# Patient Record
Sex: FEMALE | Race: WHITE | NOT HISPANIC OR LATINO
[De-identification: names, ages, dates, MRNs, and addresses within clinical notes are randomized per-mention and may not be internally consistent; named-entity substitution may affect disease eponyms.]

---

## 2017-12-19 ENCOUNTER — RESULT REVIEW (OUTPATIENT)
Age: 42
End: 2017-12-19

## 2017-12-27 ENCOUNTER — APPOINTMENT (OUTPATIENT)
Dept: FAMILY MEDICINE | Facility: CLINIC | Age: 42
End: 2017-12-27

## 2018-01-03 ENCOUNTER — APPOINTMENT (OUTPATIENT)
Dept: FAMILY MEDICINE | Facility: CLINIC | Age: 43
End: 2018-01-03

## 2018-01-30 ENCOUNTER — NON-APPOINTMENT (OUTPATIENT)
Age: 43
End: 2018-01-30

## 2018-01-30 ENCOUNTER — APPOINTMENT (OUTPATIENT)
Dept: CARDIOLOGY | Facility: CLINIC | Age: 43
End: 2018-01-30
Payer: COMMERCIAL

## 2018-01-30 VITALS
OXYGEN SATURATION: 98 % | HEIGHT: 70 IN | WEIGHT: 147 LBS | SYSTOLIC BLOOD PRESSURE: 100 MMHG | BODY MASS INDEX: 21.05 KG/M2 | DIASTOLIC BLOOD PRESSURE: 68 MMHG | HEART RATE: 73 BPM

## 2018-01-30 DIAGNOSIS — E78.5 HYPERLIPIDEMIA, UNSPECIFIED: ICD-10-CM

## 2018-01-30 DIAGNOSIS — R94.31 ABNORMAL ELECTROCARDIOGRAM [ECG] [EKG]: ICD-10-CM

## 2018-01-30 PROCEDURE — 99243 OFF/OP CNSLTJ NEW/EST LOW 30: CPT | Mod: 25

## 2018-01-30 PROCEDURE — 93000 ELECTROCARDIOGRAM COMPLETE: CPT

## 2018-02-13 ENCOUNTER — APPOINTMENT (OUTPATIENT)
Dept: CARDIOLOGY | Facility: CLINIC | Age: 43
End: 2018-02-13
Payer: COMMERCIAL

## 2018-02-13 PROCEDURE — 93015 CV STRESS TEST SUPVJ I&R: CPT

## 2018-02-14 ENCOUNTER — APPOINTMENT (OUTPATIENT)
Dept: CARDIOLOGY | Facility: CLINIC | Age: 43
End: 2018-02-14
Payer: COMMERCIAL

## 2018-02-14 PROCEDURE — 93306 TTE W/DOPPLER COMPLETE: CPT

## 2018-03-08 ENCOUNTER — NON-APPOINTMENT (OUTPATIENT)
Age: 43
End: 2018-03-08

## 2018-03-08 PROBLEM — E78.5 HYPERLIPIDEMIA: Status: ACTIVE | Noted: 2018-03-08

## 2018-12-11 ENCOUNTER — RECORD ABSTRACTING (OUTPATIENT)
Age: 43
End: 2018-12-11

## 2018-12-11 DIAGNOSIS — Z78.9 OTHER SPECIFIED HEALTH STATUS: ICD-10-CM

## 2018-12-11 DIAGNOSIS — Z80.1 FAMILY HISTORY OF MALIGNANT NEOPLASM OF TRACHEA, BRONCHUS AND LUNG: ICD-10-CM

## 2018-12-11 RX ORDER — VALACYCLOVIR HYDROCHLORIDE 1 G/1
1 TABLET, FILM COATED ORAL
Refills: 0 | Status: ACTIVE | COMMUNITY

## 2018-12-27 ENCOUNTER — APPOINTMENT (OUTPATIENT)
Dept: OBGYN | Facility: CLINIC | Age: 43
End: 2018-12-27
Payer: COMMERCIAL

## 2018-12-27 ENCOUNTER — LABORATORY RESULT (OUTPATIENT)
Age: 43
End: 2018-12-27

## 2018-12-27 VITALS
BODY MASS INDEX: 20.76 KG/M2 | WEIGHT: 145 LBS | SYSTOLIC BLOOD PRESSURE: 110 MMHG | DIASTOLIC BLOOD PRESSURE: 72 MMHG | HEIGHT: 70 IN

## 2018-12-27 DIAGNOSIS — Z78.9 OTHER SPECIFIED HEALTH STATUS: ICD-10-CM

## 2018-12-27 DIAGNOSIS — Z80.1 FAMILY HISTORY OF MALIGNANT NEOPLASM OF TRACHEA, BRONCHUS AND LUNG: ICD-10-CM

## 2018-12-27 LAB
BILIRUB UR QL STRIP: NORMAL
GLUCOSE UR-MCNC: NORMAL
HCG UR QL: 0.2 EU/DL
HCG UR QL: NEGATIVE
HGB UR QL STRIP.AUTO: NORMAL
KETONES UR-MCNC: NORMAL
LEUKOCYTE ESTERASE UR QL STRIP: NORMAL
NITRITE UR QL STRIP: NORMAL
PH UR STRIP: 6
PROT UR STRIP-MCNC: NORMAL
QUALITY CONTROL: YES
SP GR UR STRIP: 1

## 2018-12-27 PROCEDURE — 81025 URINE PREGNANCY TEST: CPT

## 2018-12-27 PROCEDURE — 81003 URINALYSIS AUTO W/O SCOPE: CPT | Mod: QW

## 2018-12-27 PROCEDURE — 99396 PREV VISIT EST AGE 40-64: CPT

## 2019-01-08 ENCOUNTER — TRANSCRIPTION ENCOUNTER (OUTPATIENT)
Age: 44
End: 2019-01-08

## 2019-01-10 LAB
CYTOLOGY CVX/VAG DOC THIN PREP: NORMAL
HPV HIGH+LOW RISK DNA PNL CVX: DETECTED

## 2019-08-20 ENCOUNTER — APPOINTMENT (OUTPATIENT)
Dept: OBGYN | Facility: CLINIC | Age: 44
End: 2019-08-20
Payer: COMMERCIAL

## 2019-08-20 VITALS
DIASTOLIC BLOOD PRESSURE: 82 MMHG | WEIGHT: 139 LBS | HEIGHT: 70 IN | SYSTOLIC BLOOD PRESSURE: 115 MMHG | BODY MASS INDEX: 19.9 KG/M2

## 2019-08-20 DIAGNOSIS — R10.2 PELVIC AND PERINEAL PAIN: ICD-10-CM

## 2019-08-20 DIAGNOSIS — E22.1 HYPERPROLACTINEMIA: ICD-10-CM

## 2019-08-20 DIAGNOSIS — D25.9 LEIOMYOMA OF UTERUS, UNSPECIFIED: ICD-10-CM

## 2019-08-20 LAB
BILIRUB UR QL STRIP: NORMAL
GLUCOSE UR-MCNC: NORMAL
HCG UR QL: 0.2 EU/DL
HCG UR QL: NEGATIVE
HGB UR QL STRIP.AUTO: ABNORMAL
KETONES UR-MCNC: NORMAL
LEUKOCYTE ESTERASE UR QL STRIP: NORMAL
NITRITE UR QL STRIP: NORMAL
PH UR STRIP: 7
PROT UR STRIP-MCNC: NORMAL
QUALITY CONTROL: YES
SP GR UR STRIP: 1.01

## 2019-08-20 PROCEDURE — 76830 TRANSVAGINAL US NON-OB: CPT

## 2019-08-20 PROCEDURE — XXXXX: CPT

## 2019-08-20 NOTE — HISTORY OF PRESENT ILLNESS
[___ Month(s) Ago] : [unfilled] month(s) ago [Good] : being in good health [Healthy Diet] : a healthy diet [Regular Exercise] : regular exercise [Last Mammogram ___] : Last Mammogram was [unfilled] [Last Pap ___] : Last cervical pap smear was [unfilled] [Reproductive Age] : is of reproductive age [Definite:  ___ (Date)] : the last menstrual period was [unfilled]

## 2019-08-21 LAB
BASOPHILS # BLD AUTO: 0.06 K/UL
BASOPHILS NFR BLD AUTO: 0.8 %
EOSINOPHIL # BLD AUTO: 0.07 K/UL
EOSINOPHIL NFR BLD AUTO: 0.9 %
ESTRADIOL SERPL-MCNC: 148 PG/ML
FSH SERPL-MCNC: 4.9 IU/L
HCT VFR BLD CALC: 42 %
HGB BLD-MCNC: 14.1 G/DL
IMM GRANULOCYTES NFR BLD AUTO: 0.3 %
LYMPHOCYTES # BLD AUTO: 2.5 K/UL
LYMPHOCYTES NFR BLD AUTO: 31.4 %
MAN DIFF?: NORMAL
MCHC RBC-ENTMCNC: 32.7 PG
MCHC RBC-ENTMCNC: 33.6 GM/DL
MCV RBC AUTO: 97.4 FL
MONOCYTES # BLD AUTO: 0.5 K/UL
MONOCYTES NFR BLD AUTO: 6.3 %
NEUTROPHILS # BLD AUTO: 4.82 K/UL
NEUTROPHILS NFR BLD AUTO: 60.3 %
PLATELET # BLD AUTO: 238 K/UL
PROLACTIN SERPL-MCNC: 37.1 NG/ML
RBC # BLD: 4.31 M/UL
RBC # FLD: 12 %
TSH SERPL-ACNC: 4.09 UIU/ML
WBC # FLD AUTO: 7.97 K/UL

## 2019-08-28 ENCOUNTER — APPOINTMENT (OUTPATIENT)
Dept: OBGYN | Facility: CLINIC | Age: 44
End: 2019-08-28
Payer: COMMERCIAL

## 2019-08-28 VITALS
BODY MASS INDEX: 19.76 KG/M2 | DIASTOLIC BLOOD PRESSURE: 60 MMHG | HEIGHT: 70 IN | SYSTOLIC BLOOD PRESSURE: 104 MMHG | WEIGHT: 138 LBS

## 2019-08-28 DIAGNOSIS — E03.9 HYPOTHYROIDISM, UNSPECIFIED: ICD-10-CM

## 2019-08-28 DIAGNOSIS — F41.9 ANXIETY DISORDER, UNSPECIFIED: ICD-10-CM

## 2019-08-28 PROCEDURE — 99214 OFFICE O/P EST MOD 30 MIN: CPT

## 2019-08-28 PROCEDURE — 36415 COLL VENOUS BLD VENIPUNCTURE: CPT

## 2019-08-28 NOTE — HISTORY OF PRESENT ILLNESS
[Last Mammogram ___] : Last Mammogram was [unfilled] [Last Pap ___] : Last cervical pap smear was [unfilled] [Reproductive Age] : is of reproductive age [Sexually Active] : is sexually active [Male ___] : [unfilled] male

## 2019-08-29 NOTE — END OF VISIT
[FreeTextEntry3] : I, Marylou Sutton, acted solely as a scribe for Dr. Mcdaniel on this date [8/28/19].\par \par All medical record entries made by the Scribe were at my, Dr. Mcdaniel's direction and personally dictated by me on [8/28/19]. I have reviewed the chart and agree that the record accurately reflects my personal performance of the history, physical exam, assessment and plan. I have also personally directed, reviewed, and agreed with the chart.\par

## 2019-08-31 ENCOUNTER — OUTPATIENT (OUTPATIENT)
Dept: OUTPATIENT SERVICES | Facility: HOSPITAL | Age: 44
LOS: 1 days | End: 2019-08-31
Payer: COMMERCIAL

## 2019-08-31 ENCOUNTER — APPOINTMENT (OUTPATIENT)
Dept: MRI IMAGING | Facility: HOSPITAL | Age: 44
End: 2019-08-31
Payer: COMMERCIAL

## 2019-08-31 DIAGNOSIS — D25.9 LEIOMYOMA OF UTERUS, UNSPECIFIED: ICD-10-CM

## 2019-08-31 DIAGNOSIS — E22.1 HYPERPROLACTINEMIA: ICD-10-CM

## 2019-08-31 PROCEDURE — A9579: CPT

## 2019-08-31 PROCEDURE — 70553 MRI BRAIN STEM W/O & W/DYE: CPT | Mod: 26

## 2019-08-31 PROCEDURE — 70553 MRI BRAIN STEM W/O & W/DYE: CPT

## 2019-09-03 ENCOUNTER — TRANSCRIPTION ENCOUNTER (OUTPATIENT)
Age: 44
End: 2019-09-03

## 2019-09-03 LAB
CYTOLOGY CVX/VAG DOC THIN PREP: NORMAL
PROLACTIN SERPL-MCNC: 20.6 NG/ML
T3 SERPL-MCNC: 76 NG/DL
T3FREE SERPL-MCNC: 2.27 PG/ML
T4 FREE SERPL-MCNC: 0.9 NG/DL
T4 SERPL-MCNC: 5 UG/DL
THYROGLOB AB SERPL-ACNC: <20 IU/ML
THYROPEROXIDASE AB SERPL IA-ACNC: <10 IU/ML
TSH SERPL-ACNC: 2.43 UIU/ML

## 2019-09-04 ENCOUNTER — APPOINTMENT (OUTPATIENT)
Dept: MRI IMAGING | Facility: CLINIC | Age: 44
End: 2019-09-04
Payer: COMMERCIAL

## 2019-09-04 ENCOUNTER — OUTPATIENT (OUTPATIENT)
Dept: OUTPATIENT SERVICES | Facility: HOSPITAL | Age: 44
LOS: 1 days | End: 2019-09-04
Payer: COMMERCIAL

## 2019-09-04 DIAGNOSIS — D25.9 LEIOMYOMA OF UTERUS, UNSPECIFIED: ICD-10-CM

## 2019-09-04 PROCEDURE — A9585: CPT

## 2019-09-04 PROCEDURE — 72197 MRI PELVIS W/O & W/DYE: CPT | Mod: 26

## 2019-09-04 PROCEDURE — 72197 MRI PELVIS W/O & W/DYE: CPT

## 2019-09-10 ENCOUNTER — APPOINTMENT (OUTPATIENT)
Dept: OBGYN | Facility: CLINIC | Age: 44
End: 2019-09-10
Payer: COMMERCIAL

## 2019-09-10 VITALS
BODY MASS INDEX: 19.76 KG/M2 | DIASTOLIC BLOOD PRESSURE: 68 MMHG | HEIGHT: 70 IN | SYSTOLIC BLOOD PRESSURE: 116 MMHG | WEIGHT: 138 LBS

## 2019-09-10 PROCEDURE — 81025 URINE PREGNANCY TEST: CPT

## 2019-09-10 PROCEDURE — 58100 BIOPSY OF UTERUS LINING: CPT

## 2019-09-10 PROCEDURE — 81003 URINALYSIS AUTO W/O SCOPE: CPT | Mod: QW

## 2019-09-10 NOTE — PROCEDURE
[Endometrial Biopsy] : Endometrial biopsy [Irregular Bleeding] : irregular uterine bleeding [Risks] : risks [Benefits] : benefits [Alternatives] : alternatives [Patient] : patient [CONSENT OBTAINED] : written consent was obtained prior to the procedure. [LMP ___] : LMP was [unfilled] [Neg Pregnancy Test] : a pregnancy test was negative [Ibuprofen ___ mg] : ibuprofen [unfilled] ~Umg [None] : none [Infection] : infection [Bleeding] : bleeding [Allergic Reaction] : allergic reaction [Pain] : pain [Uterine Perforation] : uterine perforation [Betadine] : Betadine [Tenaculum] : a single toothed tenaculum [Pipelle] : a Pipelle endometrial suction curette [Easy Passage] : allowed easy passage of a uterine sound without dilation [Sent to Histology] : the specimen was place in buffered formalin and sent for pathlogy [ECC] : Endocervical curettage was also performed [Abundant] : an abundant [Tolerated Well] : the patient tolerated the procedure well [No Complications] : there were no complications

## 2019-09-13 ENCOUNTER — OUTPATIENT (OUTPATIENT)
Dept: OUTPATIENT SERVICES | Facility: HOSPITAL | Age: 44
LOS: 1 days | End: 2019-09-13
Payer: COMMERCIAL

## 2019-09-13 ENCOUNTER — APPOINTMENT (OUTPATIENT)
Dept: GYNECOLOGIC ONCOLOGY | Facility: CLINIC | Age: 44
End: 2019-09-13
Payer: COMMERCIAL

## 2019-09-13 VITALS
RESPIRATION RATE: 20 BRPM | HEART RATE: 76 BPM | WEIGHT: 140.21 LBS | HEIGHT: 69 IN | TEMPERATURE: 99 F | SYSTOLIC BLOOD PRESSURE: 98 MMHG | DIASTOLIC BLOOD PRESSURE: 65 MMHG

## 2019-09-13 DIAGNOSIS — Z78.9 OTHER SPECIFIED HEALTH STATUS: ICD-10-CM

## 2019-09-13 DIAGNOSIS — Z01.818 ENCOUNTER FOR OTHER PREPROCEDURAL EXAMINATION: ICD-10-CM

## 2019-09-13 DIAGNOSIS — N85.8 OTHER SPECIFIED NONINFLAMMATORY DISORDERS OF UTERUS: ICD-10-CM

## 2019-09-13 DIAGNOSIS — Z29.9 ENCOUNTER FOR PROPHYLACTIC MEASURES, UNSPECIFIED: ICD-10-CM

## 2019-09-13 DIAGNOSIS — Z80.0 FAMILY HISTORY OF MALIGNANT NEOPLASM OF DIGESTIVE ORGANS: ICD-10-CM

## 2019-09-13 LAB
ANION GAP SERPL CALC-SCNC: 11 MMOL/L — SIGNIFICANT CHANGE UP (ref 5–17)
APTT BLD: 29.4 SEC — SIGNIFICANT CHANGE UP (ref 27.5–36.3)
BASOPHILS # BLD AUTO: 0.06 K/UL — SIGNIFICANT CHANGE UP (ref 0–0.2)
BASOPHILS NFR BLD AUTO: 0.9 % — SIGNIFICANT CHANGE UP (ref 0–2)
BLD GP AB SCN SERPL QL: SIGNIFICANT CHANGE UP
BUN SERPL-MCNC: 16 MG/DL — SIGNIFICANT CHANGE UP (ref 8–20)
CALCIUM SERPL-MCNC: 9.1 MG/DL — SIGNIFICANT CHANGE UP (ref 8.6–10.2)
CHLORIDE SERPL-SCNC: 105 MMOL/L — SIGNIFICANT CHANGE UP (ref 98–107)
CO2 SERPL-SCNC: 26 MMOL/L — SIGNIFICANT CHANGE UP (ref 22–29)
CREAT SERPL-MCNC: 0.56 MG/DL — SIGNIFICANT CHANGE UP (ref 0.5–1.3)
EOSINOPHIL # BLD AUTO: 0.06 K/UL — SIGNIFICANT CHANGE UP (ref 0–0.5)
EOSINOPHIL NFR BLD AUTO: 0.9 % — SIGNIFICANT CHANGE UP (ref 0–6)
GLUCOSE SERPL-MCNC: 62 MG/DL — LOW (ref 70–115)
HBA1C BLD-MCNC: 5.3 % — SIGNIFICANT CHANGE UP (ref 4–5.6)
HCG SERPL-ACNC: <4 MIU/ML — SIGNIFICANT CHANGE UP
HCT VFR BLD CALC: 39.3 % — SIGNIFICANT CHANGE UP (ref 34.5–45)
HGB BLD-MCNC: 13.2 G/DL — SIGNIFICANT CHANGE UP (ref 11.5–15.5)
IMM GRANULOCYTES NFR BLD AUTO: 0.3 % — SIGNIFICANT CHANGE UP (ref 0–1.5)
LYMPHOCYTES # BLD AUTO: 2.04 K/UL — SIGNIFICANT CHANGE UP (ref 1–3.3)
LYMPHOCYTES # BLD AUTO: 29.9 % — SIGNIFICANT CHANGE UP (ref 13–44)
MCHC RBC-ENTMCNC: 32.3 PG — SIGNIFICANT CHANGE UP (ref 27–34)
MCHC RBC-ENTMCNC: 33.6 GM/DL — SIGNIFICANT CHANGE UP (ref 32–36)
MCV RBC AUTO: 96.1 FL — SIGNIFICANT CHANGE UP (ref 80–100)
MONOCYTES # BLD AUTO: 0.44 K/UL — SIGNIFICANT CHANGE UP (ref 0–0.9)
MONOCYTES NFR BLD AUTO: 6.4 % — SIGNIFICANT CHANGE UP (ref 2–14)
NEUTROPHILS # BLD AUTO: 4.21 K/UL — SIGNIFICANT CHANGE UP (ref 1.8–7.4)
NEUTROPHILS NFR BLD AUTO: 61.6 % — SIGNIFICANT CHANGE UP (ref 43–77)
PLATELET # BLD AUTO: 312 K/UL — SIGNIFICANT CHANGE UP (ref 150–400)
POTASSIUM SERPL-MCNC: 3.7 MMOL/L — SIGNIFICANT CHANGE UP (ref 3.5–5.3)
POTASSIUM SERPL-SCNC: 3.7 MMOL/L — SIGNIFICANT CHANGE UP (ref 3.5–5.3)
RBC # BLD: 4.09 M/UL — SIGNIFICANT CHANGE UP (ref 3.8–5.2)
RBC # FLD: 11.6 % — SIGNIFICANT CHANGE UP (ref 10.3–14.5)
SODIUM SERPL-SCNC: 142 MMOL/L — SIGNIFICANT CHANGE UP (ref 135–145)
WBC # BLD: 6.83 K/UL — SIGNIFICANT CHANGE UP (ref 3.8–10.5)
WBC # FLD AUTO: 6.83 K/UL — SIGNIFICANT CHANGE UP (ref 3.8–10.5)

## 2019-09-13 PROCEDURE — 71046 X-RAY EXAM CHEST 2 VIEWS: CPT | Mod: 26

## 2019-09-13 PROCEDURE — 76857 US EXAM PELVIC LIMITED: CPT | Mod: 59

## 2019-09-13 PROCEDURE — 99245 OFF/OP CONSLTJ NEW/EST HI 55: CPT | Mod: 25

## 2019-09-13 PROCEDURE — 76830 TRANSVAGINAL US NON-OB: CPT | Mod: 59

## 2019-09-13 RX ORDER — CEFOTETAN DISODIUM 1 G
2 VIAL (EA) INJECTION ONCE
Refills: 0 | Status: DISCONTINUED | OUTPATIENT
Start: 2019-09-17 | End: 2019-09-19

## 2019-09-13 RX ORDER — SODIUM CHLORIDE 9 MG/ML
3 INJECTION INTRAMUSCULAR; INTRAVENOUS; SUBCUTANEOUS EVERY 8 HOURS
Refills: 0 | Status: DISCONTINUED | OUTPATIENT
Start: 2019-09-17 | End: 2019-09-17

## 2019-09-13 NOTE — CHIEF COMPLAINT
[FreeTextEntry1] : [facility]\par [place] [facility]\par [address]\[place] [address]\par

## 2019-09-13 NOTE — PHYSICAL EXAM
[Normal] : Parametria: Normal [Abnormal] : Bimanual Exam: Abnormal [de-identified] : 16 week fibroid uterus [de-identified] : Patient was seen and examined with chaperone Lyubov Soler PA-C.

## 2019-09-13 NOTE — PATIENT PROFILE ADULT - NSPROEDALEARNPREF_GEN_A_NUR
Pt instructed on pre-op instructions & pre-surgical infection prevention instructions by NP, Josie Yanez./verbal instruction/individual instruction/written material

## 2019-09-13 NOTE — H&P PST ADULT - ASSESSMENT
43 yo female with uterine mass.    CAPRINI VTE 2.0 SCORE [CLOT updated 2019]    AGE RELATED RISK FACTORS                                                       MOBILITY RELATED FACTORS  [ x ] Age 41-60 years                                            (1 Point)                    [ ] Bed rest                                                        (1 Point)  [ ] Age: 61-74 years                                           (2 Points)                  [ ] Plaster cast                                                   (2 Points)  [ ] Age= 75 years                                              (3 Points)                    [ ] Bed bound for more than 72 hours                 (2 Points)    DISEASE RELATED RISK FACTORS                                               GENDER SPECIFIC FACTORS  [ ] Edema in the lower extremities                       (1 Point)              [ ] Pregnancy                                                     (1 Point)  [ ] Varicose veins                                               (1 Point)                     [ ] Post-partum < 6 weeks                                   (1 Point)             [ ] BMI > 25 Kg/m2                                            (1 Point)                     [ ] Hormonal therapy  or oral contraception          (1 Point)                 [ ] Sepsis (in the previous month)                        (1 Point)               [ ] History of pregnancy complications                 (1 point)  [ ] Pneumonia or serious lung disease                                               [ ] Unexplained or recurrent                     (1 Point)           (in the previous month)                               (1 Point)  [ ] Abnormal pulmonary function test                     (1 Point)                 SURGERY RELATED RISK FACTORS  [ ] Acute myocardial infarction                              (1 Point)               [ ]  Section                                             (1 Point)  [ ] Congestive heart failure (in the previous month)  (1 Point)      [ ] Minor surgery                                                  (1 Point)   [ ] Inflammatory bowel disease                             (1 Point)               [ ] Arthroscopic surgery                                        (2 Points)  [ ] Central venous access                                      (2 Points)                [ x] General surgery lasting more than 45 minutes (2 points)  [ ] Malignancy- Present or previous                   (2 Points)                [ ] Elective arthroplasty                                         (5 points)    [ ] Stroke (in the previous month)                          (5 Points)                                                                                                                                                           HEMATOLOGY RELATED FACTORS                                                 TRAUMA RELATED RISK FACTORS  [ ] Prior episodes of VTE                                     (3 Points)                [ ] Fracture of the hip, pelvis, or leg                       (5 Points)  [ ] Positive family history for VTE                         (3 Points)             [ ] Acute spinal cord injury (in the previous month)  (5 Points)  [ ] Prothrombin 68097 A                                     (3 Points)               [ ] Paralysis  (less than 1 month)                             (5 Points)  [ ] Factor V Leiden                                             (3 Points)                  [ ] Multiple Trauma within 1 month                        (5 Points)  [ ] Lupus anticoagulants                                     (3 Points)                                                           [ ] Anticardiolipin antibodies                               (3 Points)                                                       [ ] High homocysteine in the blood                      (3 Points)                                             [ ] Other congenital or acquired thrombophilia      (3 Points)                                                [ ] Heparin induced thrombocytopenia                  (3 Points)                                     Total Score [    3      ]    OPIOID RISK TOOL    LINA EACH BOX THAT APPLIES AND ADD TOTALS AT THE END    FAMILY HISTORY OF SUBSTANCE ABUSE                 FEMALE         MALE                                                Alcohol                             [  ]1 pt          [  ]3pts                                               Illegal Drugs                     [  ]2 pts        [  ]3pts                                               Rx Drugs                           [  ]4 pts        [  ]4 pts    PERSONAL HISTORY OF SUBSTANCE ABUSE                                                                                          Alcohol                             [  ]3 pts       [  ]3 pts                                               Illegal Drugs                     [  ]4 pts        [  ]4 pts                                               Rx Drugs                           [  ]5 pts        [  ]5 pts    AGE BETWEEN 16-45 YEARS                                      [  x ]1 pt         [  ]1 pt    HISTORY OF PREADOLESCENT   SEXUAL ABUSE                                                             [  ]3 pts        [  ]0pts    PSYCHOLOGICAL DISEASE                     ADD, OCD, Bipolar, Schizophrenia        [  ]2 pts         [  ]2 pts                      Depression                                               [  ]1 pt           [  ]1 pt           SCORING TOTAL   (add numbers and type here)              (*1*)                                     A score of 3 or lower indicated LOW risk for future opioid abuse  A score of 4 to 7 indicated moderate risk for future opioid abuse  A score of 8 or higher indicates a high risk for opioid abuse

## 2019-09-13 NOTE — H&P PST ADULT - NSICDXPROBLEM_GEN_ALL_CORE_FT
PROBLEM DIAGNOSES  Problem: Uterine mass  Assessment and Plan: Total abdominal hysterectomy, bilateral salpingectomy, possible bilateral salpingo oophorectomy adn staging    Problem: Need for prophylactic measure  Assessment and Plan: Caprini score 3, mod VTE risk, SCDs ordered surgical team to assess need for pharm proph

## 2019-09-13 NOTE — PATIENT PROFILE ADULT - NSPRESCRALCSIXMORE_GEN_A_NUR
[No Acute Distress] : no acute distress [Well-Appearing] : well-appearing [Normal Sclera/Conjunctiva] : normal sclera/conjunctiva [PERRL] : pupils equal round and reactive to light [EOMI] : extraocular movements intact [No Respiratory Distress] : no respiratory distress  [Clear to Auscultation] : lungs were clear to auscultation bilaterally [Normal Rate] : normal rate  [Regular Rhythm] : with a regular rhythm [Normal S1, S2] : normal S1 and S2 [No Murmur] : no murmur heard [de-identified] : erythematous papular lesions on penile glans Never

## 2019-09-13 NOTE — ASSESSMENT
[FreeTextEntry1] : Discussed diagnosis of uterine sarcoma vs fibroid that had outgrown its blood supply. We discussed that a uterine sarcoma is very rare, however US and MRI can not distinguish between the two. \par \par I discussed at length with the patient the nature, purpose, risks, benefits, and alternatives of total abdominal hysterectomy and bilateral salpingo-oophorectomy via a horizontal incision, possible bilateral pelvic and para-aortic lymphadenectomy and surgical staging.  The patient understands the risks to include (but not be limited to) bowel injury, bleeding (with the possible need for transfusion), bladder or ureteral injury, infections, protracted wound closure, deep venous thrombosis, and mellissa-operative death.  She is also aware of  the possibility of  a unrecognized surgical complication with need for subsequent re-exploration.  She also understands the rationale for surgical procedures such as omentectomy, or pelvic and para-aortic lymphadenectomy for the proper staging of a gynecological cancer.  She agrees to proceed.  She asked numerous questions which were answered to her satisfaction.  She understands the need for a pre-operative bowel preparation and agrees to comply with our instructions. We discussed that should her pathology return as sarcoma, we may need to reoperate.

## 2019-09-13 NOTE — PATIENT PROFILE ADULT - PSYCHOSOCIAL CONCERNS
General Adult HPI





- History of Present Illness


Initial comments: 


Adela is an 20-year-old female who presents to emergency Department today 

requesting a pregnancy test.  Patient reports that her last menstrual period 

was December 24, she did take a pregnancy test the week of Christmas but it was 

negative at that time.  Patient reports that because she works long hours she's 

been unable to take pregnancy test through the entire month of January.  

Patient reports she's been experiencing breast tenderness, nausea, frequent 

vomiting, she does have some epigastric abdominal discomfort after episodes of 

vomiting.  In addition patient states that on Saturday, February 2 she was 

experiencing some vaginal spotting and cramping which was similar to period-

like cramping but more severe.  This lasted for only 2 days and resolved 

completely.  She's had no vaginal bleeding since that time.








- Related Data


 Home Medications











 Medication  Instructions  Recorded  Confirmed


 


Famotidine [Pepcid] 20 mg PO BID 10/01/17 10/01/17








 Previous Rx's











 Medication  Instructions  Recorded


 


Famotidine [Pepcid] 20 mg PO DAILY #30 tablet 10/01/17


 


Ibuprofen Oral Susp [Motrin Oral 610 mg PO Q6HR 3 Days 03/12/18





Susp]  


 


Benzonatate [Tessalon Perles] 100 mg PO TID PRN #20 capsule 01/04/19


 


Penicillin V Potassium [Pen Vee K] 500 mg PO QID 7 Days 01/04/19











 Allergies











Allergy/AdvReac Type Severity Reaction Status Date / Time


 


No Known Allergies Allergy   Verified 01/04/19 15:22














Review of Systems


ROS Statement: 


Those systems with pertinent positive or pertinent negative responses have been 

documented in the HPI.





ROS Other: All systems not noted in ROS Statement are negative.





Past Medical History


Past Medical History: No Reported History


History of Any Multi-Drug Resistant Organisms: None Reported


Past Surgical History: No Surgical Hx Reported


Past Psychological History: Anxiety


Smoking Status: Current every day smoker


Past Alcohol Use History: None Reported


Past Drug Use History: None Reported





General Exam





- General Exam Comments


Initial Comments: 


Physical Exam


GENERAL:


Patient is well-developed and well-nourished.  Patient is nontoxic and well-

hydrated and is in no distress.





HENT:


Normocephalic, Atraumatic. 





EYES:


PERRL, EOMI





PULMONARY:


Unlabored respirations.  No audible rales rhonchi or wheezing was noted.





CARDIOVASCULAR:


There is a regular rate and rhythm without any murmurs gallops or rubs.  





ABDOMEN:


Soft and nontender with normal bowel sounds. 





SKIN:


Skin is clear with no lesions or rashes and otherwise unremarkable.





: 


Deferred





NEUROLOGIC:


Patient is alert and oriented x3.  Moving all extremities spontaneously





MUSCULOSKELETAL:


Normal extremities with adequate strength and full range of motion.  No lower 

extremity swelling or edema.  No calf tenderness.  





PSYCHIATRIC:


Normal psychiatric evaluation.  





Limitations: no limitations





Course


 Vital Signs











  02/07/19





  05:06


 


Temperature 97.9 F


 


Pulse Rate 82


 


Respiratory 16





Rate 


 


Blood Pressure 128/70


 


O2 Sat by Pulse 98





Oximetry 














Medical Decision Making





- Medical Decision Making


Patient was seen and evaluated history is obtained from patient


Patient with no menstrual period since late December, experiencing symptoms 

consistent with evidence including breast tenderness, enlargement, nausea, 

vomiting patient did have pelvic pain, cramping or vaginal bleeding earlier in 

the week this is resolved now


Labs including serum beta hCG and type and screen were ordered


I advised the patient that given that she was experiencing pelvic pain and 

vaginal bleeding if pregnant I would recommend an ultrasound patient is 

agreeable to this plan considering this patient is in no acute distress I do 

not feel it necessary to order an emergent ultrasound patient willing to wait 

until 7 AM for ultrasound.





Labs resulted, UA with no evidence of UTI, BHCG is negative


Patient was updated on findings, patient eager for discharge, requesting we 

hurry up and get her paperwork as she would like to sleep, patient was given 

paperwork walked out of the ambulance bay despite being asked not to








- Lab Data


 Lab Results











  02/07/19 02/07/19 02/07/19 Range/Units





  03:15 03:26 03:31 


 


HCG, Quant    <2.4  mIU/mL


 


Urine Color  Colorless    


 


Urine Appearance  Clear    (Clear)  


 


Urine pH  7.0    (5.0-8.0)  


 


Ur Specific Gravity  1.001    (1.001-1.035)  


 


Urine Protein  Negative    (Negative)  


 


Urine Glucose (UA)  Negative    (Negative)  


 


Urine Ketones  Negative    (Negative)  


 


Urine Blood  Negative    (Negative)  


 


Urine Nitrite  Negative    (Negative)  


 


Urine Bilirubin  Negative    (Negative)  


 


Urine Urobilinogen  <2.0    (<2.0)  mg/dL


 


Ur Leukocyte Esterase  Negative    (Negative)  


 


Blood Type     


 


Blood Type Confirm   O Positive   


 


Blood Type Recheck     














  02/07/19 Range/Units





  03:31 


 


HCG, Quant   mIU/mL


 


Urine Color   


 


Urine Appearance   (Clear)  


 


Urine pH   (5.0-8.0)  


 


Ur Specific Gravity   (1.001-1.035)  


 


Urine Protein   (Negative)  


 


Urine Glucose (UA)   (Negative)  


 


Urine Ketones   (Negative)  


 


Urine Blood   (Negative)  


 


Urine Nitrite   (Negative)  


 


Urine Bilirubin   (Negative)  


 


Urine Urobilinogen   (<2.0)  mg/dL


 


Ur Leukocyte Esterase   (Negative)  


 


Blood Type  O Positive  


 


Blood Type Confirm   


 


Blood Type Recheck  CABO Indicated  














Disposition


Clinical Impression: 


 Irregular menstrual cycle





Disposition: HOME SELF-CARE


Condition: Stable


Instructions (If sedation given, give patient instructions):  Safe Sex 

Practices for Adolescents (ED)


Is patient prescribed a controlled substance at d/c from ED?: No


Referrals: 


Dom Celaya MD [Primary Care Provider] - 1-2 days


Time of Disposition: 04:57
none

## 2019-09-13 NOTE — HISTORY OF PRESENT ILLNESS
[FreeTextEntry1] : 43 yo  via , LMP 19 referred by Dr. Bailey. Patient states she had been having increased cramping and heavy menstrual periods x a few months, she had small twinge in LLQ mostly with menstrual cycle. She states in August her menstrual cycle gave her excruciating contraction like cramping, she was offered to see her primary GYN at [facility] but took Advil and pain subsided. She didn’t follow up at that time but went to her primary GYN for US, which showed an 8 cm fibroid which was degenerating. She states her prolactin level was high at that time (recently diagnosed with microadenoma). She was sent for MRI pelvis 19 displayed 10.4 cm left uterine body T2 intermediate signal mass with irregular cystic and enhancing septal components, may reflect typical cystic degeneration of fibroid, malignancy is not excluded. EMB 9/10 for abnormal uterine bleeding is pending, patient is scheduled for hysteroscopy the end of the month. Admits to occasional urinary incontinence since birth of her children. Admits to being sexually active without dyspareunia or post coital bleeding. Denies unintentional weight loss, pelvic pain. \par \par Lpap: 2018 WNL HPV +\par Lmammo: 2019 WNL per pt\par Colonoscopy 2018 WNL per pt\par \par

## 2019-09-14 LAB — CANCER AG125 SERPL-ACNC: 37 U/ML — SIGNIFICANT CHANGE UP

## 2019-09-15 ENCOUNTER — FORM ENCOUNTER (OUTPATIENT)
Age: 44
End: 2019-09-15

## 2019-09-15 ENCOUNTER — MOBILE ON CALL (OUTPATIENT)
Age: 44
End: 2019-09-15

## 2019-09-16 ENCOUNTER — FORM ENCOUNTER (OUTPATIENT)
Age: 44
End: 2019-09-16

## 2019-09-16 LAB
BILIRUB UR QL STRIP: NORMAL
CLARITY UR: CLEAR
COLLECTION METHOD: NORMAL
CORE LAB BIOPSY: NORMAL
GLUCOSE UR-MCNC: NORMAL
HCG UR QL: 0.2 EU/DL
HCG UR QL: NEGATIVE
HGB UR QL STRIP.AUTO: ABNORMAL
KETONES UR-MCNC: NORMAL
LEUKOCYTE ESTERASE UR QL STRIP: NORMAL
NITRITE UR QL STRIP: NORMAL
PH UR STRIP: 7
PROT UR STRIP-MCNC: NORMAL
QUALITY CONTROL: YES
SP GR UR STRIP: 1.01

## 2019-09-17 ENCOUNTER — RESULT REVIEW (OUTPATIENT)
Age: 44
End: 2019-09-17

## 2019-09-17 ENCOUNTER — INPATIENT (INPATIENT)
Facility: HOSPITAL | Age: 44
LOS: 1 days | Discharge: HOME | DRG: 743 | End: 2019-09-19
Attending: OBSTETRICS & GYNECOLOGY | Admitting: OBSTETRICS & GYNECOLOGY
Payer: COMMERCIAL

## 2019-09-17 VITALS
RESPIRATION RATE: 16 BRPM | DIASTOLIC BLOOD PRESSURE: 62 MMHG | OXYGEN SATURATION: 100 % | SYSTOLIC BLOOD PRESSURE: 101 MMHG | WEIGHT: 140.21 LBS | HEIGHT: 69 IN | HEART RATE: 70 BPM | TEMPERATURE: 98 F

## 2019-09-17 DIAGNOSIS — D25.9 LEIOMYOMA OF UTERUS, UNSPECIFIED: ICD-10-CM

## 2019-09-17 LAB
ABO RH CONFIRMATION: SIGNIFICANT CHANGE UP
GLUCOSE BLDC GLUCOMTR-MCNC: 103 MG/DL — HIGH (ref 70–99)
GLUCOSE BLDC GLUCOMTR-MCNC: 86 MG/DL — SIGNIFICANT CHANGE UP (ref 70–99)
GLUCOSE BLDC GLUCOMTR-MCNC: 97 MG/DL — SIGNIFICANT CHANGE UP (ref 70–99)

## 2019-09-17 PROCEDURE — 71046 X-RAY EXAM CHEST 2 VIEWS: CPT

## 2019-09-17 PROCEDURE — 88341 IMHCHEM/IMCYTCHM EA ADD ANTB: CPT | Mod: 26

## 2019-09-17 PROCEDURE — 88342 IMHCHEM/IMCYTCHM 1ST ANTB: CPT | Mod: 26

## 2019-09-17 PROCEDURE — 58150 TOTAL HYSTERECTOMY: CPT | Mod: 80

## 2019-09-17 PROCEDURE — 85730 THROMBOPLASTIN TIME PARTIAL: CPT

## 2019-09-17 PROCEDURE — 80048 BASIC METABOLIC PNL TOTAL CA: CPT

## 2019-09-17 PROCEDURE — 49084 PERITONEAL LAVAGE: CPT | Mod: 59

## 2019-09-17 PROCEDURE — 86850 RBC ANTIBODY SCREEN: CPT

## 2019-09-17 PROCEDURE — 86923 COMPATIBILITY TEST ELECTRIC: CPT

## 2019-09-17 PROCEDURE — 86901 BLOOD TYPING SEROLOGIC RH(D): CPT

## 2019-09-17 PROCEDURE — G0463: CPT

## 2019-09-17 PROCEDURE — 58150 TOTAL HYSTERECTOMY: CPT

## 2019-09-17 PROCEDURE — 86304 IMMUNOASSAY TUMOR CA 125: CPT

## 2019-09-17 PROCEDURE — 86900 BLOOD TYPING SEROLOGIC ABO: CPT

## 2019-09-17 PROCEDURE — 36415 COLL VENOUS BLD VENIPUNCTURE: CPT

## 2019-09-17 PROCEDURE — 88331 PATH CONSLTJ SURG 1 BLK 1SPC: CPT | Mod: 26

## 2019-09-17 PROCEDURE — 85027 COMPLETE CBC AUTOMATED: CPT

## 2019-09-17 PROCEDURE — 83036 HEMOGLOBIN GLYCOSYLATED A1C: CPT

## 2019-09-17 PROCEDURE — 88307 TISSUE EXAM BY PATHOLOGIST: CPT | Mod: 26

## 2019-09-17 PROCEDURE — 84702 CHORIONIC GONADOTROPIN TEST: CPT

## 2019-09-17 RX ORDER — ONDANSETRON 8 MG/1
4 TABLET, FILM COATED ORAL ONCE
Refills: 0 | Status: DISCONTINUED | OUTPATIENT
Start: 2019-09-17 | End: 2019-09-17

## 2019-09-17 RX ORDER — NALOXONE HYDROCHLORIDE 4 MG/.1ML
0.1 SPRAY NASAL
Refills: 0 | Status: DISCONTINUED | OUTPATIENT
Start: 2019-09-17 | End: 2019-09-19

## 2019-09-17 RX ORDER — ONDANSETRON 8 MG/1
4 TABLET, FILM COATED ORAL EVERY 6 HOURS
Refills: 0 | Status: DISCONTINUED | OUTPATIENT
Start: 2019-09-17 | End: 2019-09-19

## 2019-09-17 RX ORDER — ENOXAPARIN SODIUM 100 MG/ML
40 INJECTION SUBCUTANEOUS EVERY 24 HOURS
Refills: 0 | Status: DISCONTINUED | OUTPATIENT
Start: 2019-09-18 | End: 2019-09-19

## 2019-09-17 RX ORDER — DEXTROSE MONOHYDRATE, SODIUM CHLORIDE, AND POTASSIUM CHLORIDE 50; .745; 4.5 G/1000ML; G/1000ML; G/1000ML
1000 INJECTION, SOLUTION INTRAVENOUS
Refills: 0 | Status: DISCONTINUED | OUTPATIENT
Start: 2019-09-17 | End: 2019-09-18

## 2019-09-17 RX ORDER — SODIUM CHLORIDE 9 MG/ML
1000 INJECTION, SOLUTION INTRAVENOUS
Refills: 0 | Status: DISCONTINUED | OUTPATIENT
Start: 2019-09-17 | End: 2019-09-17

## 2019-09-17 RX ORDER — FENTANYL CITRATE 50 UG/ML
50 INJECTION INTRAVENOUS
Refills: 0 | Status: DISCONTINUED | OUTPATIENT
Start: 2019-09-17 | End: 2019-09-17

## 2019-09-17 RX ORDER — FENTANYL CITRATE 50 UG/ML
25 INJECTION INTRAVENOUS
Refills: 0 | Status: DISCONTINUED | OUTPATIENT
Start: 2019-09-17 | End: 2019-09-17

## 2019-09-17 RX ORDER — CEFOTETAN DISODIUM 1 G
1 VIAL (EA) INJECTION ONCE
Refills: 0 | Status: COMPLETED | OUTPATIENT
Start: 2019-09-18 | End: 2019-09-18

## 2019-09-17 RX ORDER — METOCLOPRAMIDE HCL 10 MG
10 TABLET ORAL ONCE
Refills: 0 | Status: DISCONTINUED | OUTPATIENT
Start: 2019-09-17 | End: 2019-09-17

## 2019-09-17 RX ORDER — HYDROMORPHONE HYDROCHLORIDE 2 MG/ML
30 INJECTION INTRAMUSCULAR; INTRAVENOUS; SUBCUTANEOUS
Refills: 0 | Status: DISCONTINUED | OUTPATIENT
Start: 2019-09-17 | End: 2019-09-18

## 2019-09-17 RX ORDER — KETOROLAC TROMETHAMINE 30 MG/ML
30 SYRINGE (ML) INJECTION EVERY 6 HOURS
Refills: 0 | Status: DISCONTINUED | OUTPATIENT
Start: 2019-09-17 | End: 2019-09-18

## 2019-09-17 RX ADMIN — HYDROMORPHONE HYDROCHLORIDE 30 MILLILITER(S): 2 INJECTION INTRAMUSCULAR; INTRAVENOUS; SUBCUTANEOUS at 18:55

## 2019-09-17 RX ADMIN — ONDANSETRON 4 MILLIGRAM(S): 8 TABLET, FILM COATED ORAL at 22:06

## 2019-09-17 RX ADMIN — HYDROMORPHONE HYDROCHLORIDE 30 MILLILITER(S): 2 INJECTION INTRAMUSCULAR; INTRAVENOUS; SUBCUTANEOUS at 21:46

## 2019-09-17 RX ADMIN — Medication 30 MILLIGRAM(S): at 23:56

## 2019-09-17 RX ADMIN — Medication 30 MILLIGRAM(S): at 23:26

## 2019-09-17 RX ADMIN — FENTANYL CITRATE 50 MICROGRAM(S): 50 INJECTION INTRAVENOUS at 18:54

## 2019-09-17 RX ADMIN — Medication 30 MILLIGRAM(S): at 18:54

## 2019-09-17 NOTE — BRIEF OPERATIVE NOTE - NSICDXBRIEFPROCEDURE_GEN_ALL_CORE_FT
PROCEDURES:  Bilateral salpingectomy 17-Sep-2019 18:19:47  Antonieta Murdock  Open total abdominal hysterectomy 17-Sep-2019 18:19:40  Antonieta Murdock

## 2019-09-17 NOTE — CHART NOTE - NSCHARTNOTEFT_GEN_A_CORE
S: Patient seen and examined at bedside in PACU.   Pain is controlled on current pain regimen.  Tolerating ice cubes, denies nausea/vomiting, will advance as she feels comfortable.  Not yet passing flatus    O:  T(C): 36.4 (09-17-19 @ 19:30), Max: 36.9 (09-17-19 @ 13:02)  HR: 60 (09-17-19 @ 21:00) (52 - 70)  BP: 99/59 (09-17-19 @ 21:00) (90/35 - 101/62)  RR: 13 (09-17-19 @ 21:00) (10 - 16)  SpO2: 99% (09-17-19 @ 21:00) (98% - 100%)      PE:  General: NAD  Heart: RRR  Lungs: CTABL  Abdomen: soft, mild diffuse tenderness, +BS, dressing with minimal blood, outlined for observation  Ext: no calf pain    A/P: 44y s/p total abdominal hysterectomy and bilateral salpingectomy 2/2 fibroid uterus.   -Continue post operative care  -Continue current pain medication  -Regular diet, advancing as tolerated   -Encourage incentive spirometry and ambulation  -Will d/c torres in AM  -Will d/c IV fluids once tolerating PO and urinating   -Switch to PO meds in AM

## 2019-09-18 ENCOUNTER — RESULT REVIEW (OUTPATIENT)
Age: 44
End: 2019-09-18

## 2019-09-18 LAB
ANION GAP SERPL CALC-SCNC: 12 MMOL/L — SIGNIFICANT CHANGE UP (ref 5–17)
BASOPHILS # BLD AUTO: 0.02 K/UL — SIGNIFICANT CHANGE UP (ref 0–0.2)
BASOPHILS NFR BLD AUTO: 0.2 % — SIGNIFICANT CHANGE UP (ref 0–2)
BUN SERPL-MCNC: 12 MG/DL — SIGNIFICANT CHANGE UP (ref 8–20)
CALCIUM SERPL-MCNC: 8.4 MG/DL — LOW (ref 8.6–10.2)
CHLORIDE SERPL-SCNC: 99 MMOL/L — SIGNIFICANT CHANGE UP (ref 98–107)
CO2 SERPL-SCNC: 23 MMOL/L — SIGNIFICANT CHANGE UP (ref 22–29)
CREAT SERPL-MCNC: 0.55 MG/DL — SIGNIFICANT CHANGE UP (ref 0.5–1.3)
EOSINOPHIL # BLD AUTO: 0.09 K/UL — SIGNIFICANT CHANGE UP (ref 0–0.5)
EOSINOPHIL NFR BLD AUTO: 0.8 % — SIGNIFICANT CHANGE UP (ref 0–6)
GLUCOSE SERPL-MCNC: 145 MG/DL — HIGH (ref 70–115)
HCT VFR BLD CALC: 37.4 % — SIGNIFICANT CHANGE UP (ref 34.5–45)
HGB BLD-MCNC: 12.4 G/DL — SIGNIFICANT CHANGE UP (ref 11.5–15.5)
IMM GRANULOCYTES NFR BLD AUTO: 0.6 % — SIGNIFICANT CHANGE UP (ref 0–1.5)
LYMPHOCYTES # BLD AUTO: 0.75 K/UL — LOW (ref 1–3.3)
LYMPHOCYTES # BLD AUTO: 7.1 % — LOW (ref 13–44)
MAGNESIUM SERPL-MCNC: 2.4 MG/DL — SIGNIFICANT CHANGE UP (ref 1.6–2.6)
MCHC RBC-ENTMCNC: 32.1 PG — SIGNIFICANT CHANGE UP (ref 27–34)
MCHC RBC-ENTMCNC: 33.2 GM/DL — SIGNIFICANT CHANGE UP (ref 32–36)
MCV RBC AUTO: 96.9 FL — SIGNIFICANT CHANGE UP (ref 80–100)
MONOCYTES # BLD AUTO: 0.69 K/UL — SIGNIFICANT CHANGE UP (ref 0–0.9)
MONOCYTES NFR BLD AUTO: 6.5 % — SIGNIFICANT CHANGE UP (ref 2–14)
NEUTROPHILS # BLD AUTO: 8.99 K/UL — HIGH (ref 1.8–7.4)
NEUTROPHILS NFR BLD AUTO: 84.8 % — HIGH (ref 43–77)
PLATELET # BLD AUTO: 266 K/UL — SIGNIFICANT CHANGE UP (ref 150–400)
POTASSIUM SERPL-MCNC: 4.5 MMOL/L — SIGNIFICANT CHANGE UP (ref 3.5–5.3)
POTASSIUM SERPL-SCNC: 4.5 MMOL/L — SIGNIFICANT CHANGE UP (ref 3.5–5.3)
RBC # BLD: 3.86 M/UL — SIGNIFICANT CHANGE UP (ref 3.8–5.2)
RBC # FLD: 11.6 % — SIGNIFICANT CHANGE UP (ref 10.3–14.5)
SODIUM SERPL-SCNC: 134 MMOL/L — LOW (ref 135–145)
WBC # BLD: 10.6 K/UL — HIGH (ref 3.8–10.5)
WBC # FLD AUTO: 10.6 K/UL — HIGH (ref 3.8–10.5)

## 2019-09-18 PROCEDURE — 88112 CYTOPATH CELL ENHANCE TECH: CPT | Mod: 26

## 2019-09-18 PROCEDURE — 88305 TISSUE EXAM BY PATHOLOGIST: CPT | Mod: 26

## 2019-09-18 RX ORDER — OXYCODONE AND ACETAMINOPHEN 5; 325 MG/1; MG/1
2 TABLET ORAL EVERY 4 HOURS
Refills: 0 | Status: DISCONTINUED | OUTPATIENT
Start: 2019-09-18 | End: 2019-09-19

## 2019-09-18 RX ORDER — SODIUM CHLORIDE 9 MG/ML
500 INJECTION INTRAMUSCULAR; INTRAVENOUS; SUBCUTANEOUS ONCE
Refills: 0 | Status: COMPLETED | OUTPATIENT
Start: 2019-09-18 | End: 2019-09-18

## 2019-09-18 RX ORDER — DEXTROSE MONOHYDRATE, SODIUM CHLORIDE, AND POTASSIUM CHLORIDE 50; .745; 4.5 G/1000ML; G/1000ML; G/1000ML
1000 INJECTION, SOLUTION INTRAVENOUS
Refills: 0 | Status: DISCONTINUED | OUTPATIENT
Start: 2019-09-18 | End: 2019-09-19

## 2019-09-18 RX ORDER — OXYCODONE AND ACETAMINOPHEN 5; 325 MG/1; MG/1
1 TABLET ORAL EVERY 4 HOURS
Refills: 0 | Status: DISCONTINUED | OUTPATIENT
Start: 2019-09-18 | End: 2019-09-19

## 2019-09-18 RX ADMIN — OXYCODONE AND ACETAMINOPHEN 1 TABLET(S): 5; 325 TABLET ORAL at 22:06

## 2019-09-18 RX ADMIN — Medication 30 MILLIGRAM(S): at 17:16

## 2019-09-18 RX ADMIN — Medication 30 MILLIGRAM(S): at 23:24

## 2019-09-18 RX ADMIN — Medication 30 MILLIGRAM(S): at 17:44

## 2019-09-18 RX ADMIN — Medication 30 MILLIGRAM(S): at 12:40

## 2019-09-18 RX ADMIN — Medication 30 MILLIGRAM(S): at 23:54

## 2019-09-18 RX ADMIN — Medication 100 GRAM(S): at 03:05

## 2019-09-18 RX ADMIN — OXYCODONE AND ACETAMINOPHEN 1 TABLET(S): 5; 325 TABLET ORAL at 15:40

## 2019-09-18 RX ADMIN — OXYCODONE AND ACETAMINOPHEN 1 TABLET(S): 5; 325 TABLET ORAL at 14:20

## 2019-09-18 RX ADMIN — ENOXAPARIN SODIUM 40 MILLIGRAM(S): 100 INJECTION SUBCUTANEOUS at 06:15

## 2019-09-18 RX ADMIN — ONDANSETRON 4 MILLIGRAM(S): 8 TABLET, FILM COATED ORAL at 09:22

## 2019-09-18 RX ADMIN — Medication 30 MILLIGRAM(S): at 06:45

## 2019-09-18 RX ADMIN — OXYCODONE AND ACETAMINOPHEN 1 TABLET(S): 5; 325 TABLET ORAL at 22:36

## 2019-09-18 RX ADMIN — Medication 30 MILLIGRAM(S): at 06:15

## 2019-09-18 RX ADMIN — Medication 30 MILLIGRAM(S): at 12:17

## 2019-09-18 RX ADMIN — HYDROMORPHONE HYDROCHLORIDE 30 MILLILITER(S): 2 INJECTION INTRAMUSCULAR; INTRAVENOUS; SUBCUTANEOUS at 07:41

## 2019-09-18 RX ADMIN — SODIUM CHLORIDE 250 MILLILITER(S): 9 INJECTION INTRAMUSCULAR; INTRAVENOUS; SUBCUTANEOUS at 17:16

## 2019-09-18 NOTE — PROGRESS NOTE ADULT - SUBJECTIVE AND OBJECTIVE BOX
GYNECOLOGIC ONCOLOGY PROGRESS NOTE    POD#1    PROBLEMS:  Uterine mass  Need for prophylactic measure      Pt seen and examined at bedside.     SUBJECTIVE:    Patient is without complaints.  Pain well-controlled.  Flatus: denies  Reports minimal nausea, but not vomiting.   Denies shortness of breath, chest pain or dyspnea at rest.  Tolerating regular diet.     OBJECTIVE:     VITALS:  T(F): 97.8 (09-18-19 @ 06:18), Max: 98.5 (09-17-19 @ 13:02)  HR: 60 (09-18-19 @ 06:18) (52 - 70)  BP: 95/58 (09-18-19 @ 06:18) (90/35 - 101/62)  RR: 17 (09-18-19 @ 06:18) (10 - 18)  SpO2: 96% (09-18-19 @ 06:18) (96% - 100%)  Wt(kg): --    I&O's Summary    17 Sep 2019 07:01  -  18 Sep 2019 06:20  --------------------------------------------------------  IN: 0 mL / OUT: 450 mL / NET: -450 mL        MEDICATIONS  (STANDING):  cefoTEtan  IVPB 2 Gram(s) IV Intermittent once  dextrose 5% + sodium chloride 0.45% with potassium chloride 20 mEq/L 1000 milliLiter(s) (125 mL/Hr) IV Continuous <Continuous>  enoxaparin Injectable 40 milliGRAM(s) SubCutaneous every 24 hours  HYDROmorphone PCA (1 mG/mL) 30 milliLiter(s) PCA Continuous PCA Continuous  ketorolac   Injectable 30 milliGRAM(s) IV Push every 6 hours    MEDICATIONS  (PRN):  naloxone Injectable 0.1 milliGRAM(s) IV Push every 3 minutes PRN For ANY of the following changes in patient status:  A. RR LESS THAN 10 breaths per minute, B. Oxygen saturation LESS THAN 90%, C. Sedation score of 6  ondansetron Injectable 4 milliGRAM(s) IV Push every 6 hours PRN Nausea  ondansetron Injectable 4 milliGRAM(s) IV Push every 6 hours PRN Postoperative Nausea and/or Vomiting      Physical Exam:  Constitutional: NAD  Pulmonary: clear to auscultation bilaterally   Cardiovascular: Regular rate and rhythm   Abdomen: soft, non-tender, non-distended, normal bowel sounds  Extremities: no lower extremity edema or calve tenderness, Paty's sign negative.  Incision: Clean, dry, intact.  Without signs of infection or hernia.                  RADIOLOGY & ADDITIONAL TESTS:

## 2019-09-18 NOTE — PROGRESS NOTE ADULT - ASSESSMENT
45 y/o now POD#1 s/p total abdominal hysterectomy and bilateral salpingectomy 2/2 fibroid uterus.     Neuro: pain well controlled, will d/c PCA today   CV: BP and HR WNL, no outstanding concerns  Pulm: CTAB, denies respiratory distress, o2 sat appropriate on RA   GI/Nutrition: tolerating regular diet, minimal nausea controlled with antiemetics PRN  /Renal: torres catheter taken out soon before interview, UOP adequate overnight, trial of void   Lines/Tubes: peripheral IV   Skin: dressing change overnight for saturated status, now dry, will take off later today   Proph: SCD's, Lovenox   Dispo: will continue to monitor post op recovery

## 2019-09-18 NOTE — CHART NOTE - NSCHARTNOTEFT_GEN_A_CORE
RN called to evaluate dressing  Dressing saturated but dry  Removed dressing, steri strips with blood but no active bleeding from incision site  New telfa placed, will reassess during AM rounds

## 2019-09-18 NOTE — END OF VISIT
[FreeTextEntry3] : I, Marylou Suttno, acted solely as a scribe for Dr. Mcdaniel on this date 09/10/2019.\par \par All medical record entries made by the Scribe were at my, Dr. Mcdaniel's direction and personally dictated by me on 09/10/2019. I have reviewed the chart and agree that the record accurately reflects my personal performance of the history, physical exam, assessment and plan. I have also personally directed, reviewed, and agreed with the chart.\par

## 2019-09-18 NOTE — PROGRESS NOTE ADULT - SUBJECTIVE AND OBJECTIVE BOX
Patient seen for afternoon rounds with Dr. Morales. She reports persistent dizziness with stable labs and VS, will give 1/2L bolus and continue to monitor. Otherwise to continue routine post-operative care.

## 2019-09-19 ENCOUNTER — TRANSCRIPTION ENCOUNTER (OUTPATIENT)
Age: 44
End: 2019-09-19

## 2019-09-19 VITALS
RESPIRATION RATE: 18 BRPM | SYSTOLIC BLOOD PRESSURE: 101 MMHG | TEMPERATURE: 98 F | DIASTOLIC BLOOD PRESSURE: 67 MMHG | OXYGEN SATURATION: 95 % | HEART RATE: 69 BPM

## 2019-09-19 LAB
ANION GAP SERPL CALC-SCNC: 8 MMOL/L — SIGNIFICANT CHANGE UP (ref 5–17)
BASOPHILS # BLD AUTO: 0.03 K/UL — SIGNIFICANT CHANGE UP (ref 0–0.2)
BASOPHILS NFR BLD AUTO: 0.4 % — SIGNIFICANT CHANGE UP (ref 0–2)
BUN SERPL-MCNC: 11 MG/DL — SIGNIFICANT CHANGE UP (ref 8–20)
CALCIUM SERPL-MCNC: 8.2 MG/DL — LOW (ref 8.6–10.2)
CHLORIDE SERPL-SCNC: 107 MMOL/L — SIGNIFICANT CHANGE UP (ref 98–107)
CO2 SERPL-SCNC: 25 MMOL/L — SIGNIFICANT CHANGE UP (ref 22–29)
CREAT SERPL-MCNC: 0.56 MG/DL — SIGNIFICANT CHANGE UP (ref 0.5–1.3)
EOSINOPHIL # BLD AUTO: 0.05 K/UL — SIGNIFICANT CHANGE UP (ref 0–0.5)
EOSINOPHIL NFR BLD AUTO: 0.7 % — SIGNIFICANT CHANGE UP (ref 0–6)
GLUCOSE SERPL-MCNC: 99 MG/DL — SIGNIFICANT CHANGE UP (ref 70–115)
HCT VFR BLD CALC: 31.1 % — LOW (ref 34.5–45)
HGB BLD-MCNC: 10.2 G/DL — LOW (ref 11.5–15.5)
IMM GRANULOCYTES NFR BLD AUTO: 0.3 % — SIGNIFICANT CHANGE UP (ref 0–1.5)
LYMPHOCYTES # BLD AUTO: 2.17 K/UL — SIGNIFICANT CHANGE UP (ref 1–3.3)
LYMPHOCYTES # BLD AUTO: 31.4 % — SIGNIFICANT CHANGE UP (ref 13–44)
MCHC RBC-ENTMCNC: 31.9 PG — SIGNIFICANT CHANGE UP (ref 27–34)
MCHC RBC-ENTMCNC: 32.8 GM/DL — SIGNIFICANT CHANGE UP (ref 32–36)
MCV RBC AUTO: 97.2 FL — SIGNIFICANT CHANGE UP (ref 80–100)
MONOCYTES # BLD AUTO: 0.63 K/UL — SIGNIFICANT CHANGE UP (ref 0–0.9)
MONOCYTES NFR BLD AUTO: 9.1 % — SIGNIFICANT CHANGE UP (ref 2–14)
NEUTROPHILS # BLD AUTO: 4.01 K/UL — SIGNIFICANT CHANGE UP (ref 1.8–7.4)
NEUTROPHILS NFR BLD AUTO: 58.1 % — SIGNIFICANT CHANGE UP (ref 43–77)
PLATELET # BLD AUTO: 198 K/UL — SIGNIFICANT CHANGE UP (ref 150–400)
POTASSIUM SERPL-MCNC: 4.1 MMOL/L — SIGNIFICANT CHANGE UP (ref 3.5–5.3)
POTASSIUM SERPL-SCNC: 4.1 MMOL/L — SIGNIFICANT CHANGE UP (ref 3.5–5.3)
RBC # BLD: 3.2 M/UL — LOW (ref 3.8–5.2)
RBC # FLD: 11.9 % — SIGNIFICANT CHANGE UP (ref 10.3–14.5)
SODIUM SERPL-SCNC: 140 MMOL/L — SIGNIFICANT CHANGE UP (ref 135–145)
WBC # BLD: 6.91 K/UL — SIGNIFICANT CHANGE UP (ref 3.8–10.5)
WBC # FLD AUTO: 6.91 K/UL — SIGNIFICANT CHANGE UP (ref 3.8–10.5)

## 2019-09-19 PROCEDURE — 82962 GLUCOSE BLOOD TEST: CPT

## 2019-09-19 PROCEDURE — 88307 TISSUE EXAM BY PATHOLOGIST: CPT

## 2019-09-19 PROCEDURE — 85027 COMPLETE CBC AUTOMATED: CPT

## 2019-09-19 PROCEDURE — 88342 IMHCHEM/IMCYTCHM 1ST ANTB: CPT

## 2019-09-19 PROCEDURE — 36415 COLL VENOUS BLD VENIPUNCTURE: CPT

## 2019-09-19 PROCEDURE — 83735 ASSAY OF MAGNESIUM: CPT

## 2019-09-19 PROCEDURE — 80048 BASIC METABOLIC PNL TOTAL CA: CPT

## 2019-09-19 PROCEDURE — 88341 IMHCHEM/IMCYTCHM EA ADD ANTB: CPT

## 2019-09-19 PROCEDURE — 88331 PATH CONSLTJ SURG 1 BLK 1SPC: CPT

## 2019-09-19 RX ADMIN — ENOXAPARIN SODIUM 40 MILLIGRAM(S): 100 INJECTION SUBCUTANEOUS at 05:54

## 2019-09-19 NOTE — DISCHARGE NOTE PROVIDER - NSDCFUSCHEDAPPT_GEN_ALL_CORE_FT
MARIS JARRETT ; 09/24/2019 ; [facility] [address]  MARIS JARRETT ; 10/07/2019 ; [facility] [address]

## 2019-09-19 NOTE — DISCHARGE NOTE PROVIDER - HOSPITAL COURSE
Patient post-operatively had an uncomplicated hospital course. Her pain was well controlled. She is tolerating a regular diet. She is ambulating independently. She was able to void after removal of torres. Patient with flatus. Labs and Vitals WNL upon discharge.

## 2019-09-19 NOTE — DISCHARGE NOTE PROVIDER - CARE PROVIDER_API CALL
Regino oMrales)  Gynecologic Oncology; Obstetrics and Gynecology  [address] [place] [address]  Phone: [phone]  Fax: [phone]  Follow Up Time:

## 2019-09-19 NOTE — PROGRESS NOTE ADULT - ASSESSMENT
45 y/o now POD#2 s/p total abdominal hysterectomy and bilateral salpingectomy 2/2 fibroid uterus.     Neuro: pain well controlled on PO meds    CV: BP and HR WNL, no outstanding concerns  Pulm: CTAB, denies respiratory distress, o2 sat appropriate on RA   GI/Nutrition: tolerating regular diet, passing flatus, denies N/V   /Renal: voiding spontaneously   Lines/Tubes: peripheral IV   Skin: incision intact, minimal old blood, no active bleeding, healing appropriately   Proph: SCD's, Lovenox   Dispo: will discuss discharge today

## 2019-09-19 NOTE — DISCHARGE NOTE PROVIDER - NSDCFUADDAPPT_GEN_ALL_CORE_FT
Please follow-up with PA in one week for post-op visit/removal of sutures.  Follow-up with Dr. Morales in two weeks to review pathology report.

## 2019-09-19 NOTE — PROGRESS NOTE ADULT - SUBJECTIVE AND OBJECTIVE BOX
GYNECOLOGIC ONCOLOGY PROGRESS NOTE    POD#2    PROBLEMS:  Uterine mass  Need for prophylactic measure      Pt seen and examined at bedside.     SUBJECTIVE:    Patient is without new complaints.  Dizziness and lightheadedness with ambulation resolved. Ambulating without difficulty.   Pain well-controlled with medication.   Flatus: yes   Denies Nausea, Vomiting or Diarrhea.  Denies shortness of breath, chest pain or dyspnea on exertion.  Tolerating diet.    OBJECTIVE:     VITALS:  T(F): 98.6 (09-19-19 @ 06:00), Max: 98.8 (09-18-19 @ 23:48)  HR: 80 (09-19-19 @ 06:00) (50 - 80)  BP: 91/57 (09-19-19 @ 06:00) (91/57 - 97/61)  RR: 18 (09-19-19 @ 06:00) (17 - 18)  SpO2: 95% (09-18-19 @ 23:48) (95% - 96%)  Wt(kg): --    I&O's Summary    17 Sep 2019 07:01  -  18 Sep 2019 07:00  --------------------------------------------------------  IN: 1290 mL / OUT: 800 mL / NET: 490 mL    18 Sep 2019 07:01  -  19 Sep 2019 06:39  --------------------------------------------------------  IN: 0 mL / OUT: 375 mL / NET: -375 mL        MEDICATIONS  (STANDING):  cefoTEtan  IVPB 2 Gram(s) IV Intermittent once  dextrose 5% + sodium chloride 0.45% with potassium chloride 20 mEq/L 1000 milliLiter(s) (60 mL/Hr) IV Continuous <Continuous>  enoxaparin Injectable 40 milliGRAM(s) SubCutaneous every 24 hours    MEDICATIONS  (PRN):  naloxone Injectable 0.1 milliGRAM(s) IV Push every 3 minutes PRN For ANY of the following changes in patient status:  A. RR LESS THAN 10 breaths per minute, B. Oxygen saturation LESS THAN 90%, C. Sedation score of 6  ondansetron Injectable 4 milliGRAM(s) IV Push every 6 hours PRN Nausea  ondansetron Injectable 4 milliGRAM(s) IV Push every 6 hours PRN Postoperative Nausea and/or Vomiting  oxyCODONE    5 mG/acetaminophen 325 mG 1 Tablet(s) Oral every 4 hours PRN Moderate Pain (4 - 6)  oxyCODONE    5 mG/acetaminophen 325 mG 2 Tablet(s) Oral every 4 hours PRN Severe Pain (7 - 10)      Physical Exam:  Constitutional: NAD  Pulmonary: clear to auscultation bilaterally   Cardiovascular: Regular rate and rhythm   Abdomen: soft, non-tender, non-distended, +bowel sounds  Extremities: no lower extremity edema or calve tenderness, Paty's sign negative.  Incision: intact, minimal old blood.    LABS:                        12.4   10.60 )-----------( 266      ( 18 Sep 2019 07:30 )             37.4     09-18    134<L>  |  99  |  12.0  ----------------------------<  145<H>  4.5   |  23.0  |  0.55    Ca    8.4<L>      18 Sep 2019 07:30  Mg     2.4     09-18

## 2019-09-19 NOTE — DISCHARGE NOTE NURSING/CASE MANAGEMENT/SOCIAL WORK - PATIENT PORTAL LINK FT
You can access the FollowMyHealth Patient Portal offered by [facility] by registering at the following website: [email or web address]. By joining [facility]’s FollowMyHealth portal, you will also be able to view your health information using other applications (apps) compatible with our system.

## 2019-09-20 LAB — NON-GYNECOLOGICAL CYTOLOGY STUDY: SIGNIFICANT CHANGE UP

## 2019-09-24 ENCOUNTER — APPOINTMENT (OUTPATIENT)
Dept: OBGYN | Facility: CLINIC | Age: 44
End: 2019-09-24

## 2019-10-01 ENCOUNTER — FORM ENCOUNTER (OUTPATIENT)
Age: 44
End: 2019-10-01

## 2019-10-01 PROBLEM — I45.10 UNSPECIFIED RIGHT BUNDLE-BRANCH BLOCK: Chronic | Status: ACTIVE | Noted: 2019-09-13

## 2019-10-01 LAB — SURGICAL PATHOLOGY STUDY: SIGNIFICANT CHANGE UP

## 2019-10-02 ENCOUNTER — APPOINTMENT (OUTPATIENT)
Dept: GYNECOLOGIC ONCOLOGY | Facility: CLINIC | Age: 44
End: 2019-10-02
Payer: COMMERCIAL

## 2019-10-02 VITALS
OXYGEN SATURATION: 100 % | HEIGHT: 70 IN | RESPIRATION RATE: 16 BRPM | WEIGHT: 135 LBS | BODY MASS INDEX: 19.33 KG/M2 | HEART RATE: 81 BPM | DIASTOLIC BLOOD PRESSURE: 62 MMHG | SYSTOLIC BLOOD PRESSURE: 116 MMHG

## 2019-10-02 DIAGNOSIS — D25.9 LEIOMYOMA OF UTERUS, UNSPECIFIED: ICD-10-CM

## 2019-10-02 PROCEDURE — 99024 POSTOP FOLLOW-UP VISIT: CPT

## 2019-10-02 NOTE — REASON FOR VISIT
[Post Op] : post op visit [Spouse] : spouse [de-identified] : OLEG, BS, Pelvic Washings, Frozen Section for a uterine mass. Surgery was performed on 9/17/19 at [facility] [de-identified] : 9/17/19

## 2019-10-02 NOTE — DISCUSSION/SUMMARY
[Findings] : These findings were discussed with [unfilled] in detail. She understood and accepted the rationale for this recommendation and also understood the serious impact that these findings could have upon her prognosis for survival. [FreeTextEntry1] : Pertinent findings showed 17 week size uterus with large degenerating uterine mass with benign frozen section. Normal tubes and ovaries. Normal upper abdomen. No lymphadenopathy. No gross evidence of a metastatic process.

## 2019-10-02 NOTE — END OF VISIT
[FreeTextEntry3] : Written by Stacie Garrison, acting as a scribe for Dr. Regino Morales.\par This note accurately reflects the work and decisions made by me\par

## 2019-10-07 ENCOUNTER — APPOINTMENT (OUTPATIENT)
Dept: OBGYN | Facility: CLINIC | Age: 44
End: 2019-10-07

## 2019-10-22 ENCOUNTER — APPOINTMENT (OUTPATIENT)
Dept: OBGYN | Facility: CLINIC | Age: 44
End: 2019-10-22
Payer: COMMERCIAL

## 2019-10-22 VITALS
HEIGHT: 70 IN | WEIGHT: 139 LBS | SYSTOLIC BLOOD PRESSURE: 118 MMHG | BODY MASS INDEX: 19.9 KG/M2 | DIASTOLIC BLOOD PRESSURE: 84 MMHG

## 2019-10-22 DIAGNOSIS — Z98.890 OTHER SPECIFIED POSTPROCEDURAL STATES: ICD-10-CM

## 2019-10-22 PROCEDURE — 99213 OFFICE O/P EST LOW 20 MIN: CPT

## 2019-10-22 NOTE — HISTORY OF PRESENT ILLNESS
[Last Mammogram ___] : Last Mammogram was [unfilled] [Last Bone Density ___] : Last bone density studies [unfilled] [Last Colonoscopy ___] : Last colonoscopy [unfilled] [Last Pap ___] : Last cervical pap smear was [unfilled] [Definite:  ___ (Date)] : the last menstrual period was [unfilled] [Menarche Age: ____] : age at menarche was [unfilled] [Sexually Active] : is sexually active [Monogamous] : is monogamous [Contraception] : uses contraception [Partner Vasectomy] : has a partner with a vasectomy [Male ___] : [unfilled] male

## 2019-12-05 LAB — PROLACTIN SERPL-MCNC: 43.9 NG/ML

## 2020-01-16 ENCOUNTER — APPOINTMENT (OUTPATIENT)
Dept: OBGYN | Facility: CLINIC | Age: 45
End: 2020-01-16

## 2020-02-06 ENCOUNTER — APPOINTMENT (OUTPATIENT)
Dept: OBGYN | Facility: CLINIC | Age: 45
End: 2020-02-06
Payer: COMMERCIAL

## 2020-02-06 VITALS
DIASTOLIC BLOOD PRESSURE: 76 MMHG | WEIGHT: 141 LBS | HEIGHT: 70 IN | BODY MASS INDEX: 20.19 KG/M2 | SYSTOLIC BLOOD PRESSURE: 116 MMHG

## 2020-02-06 DIAGNOSIS — Z01.419 ENCOUNTER FOR GYNECOLOGICAL EXAMINATION (GENERAL) (ROUTINE) W/OUT ABNORMAL FINDINGS: ICD-10-CM

## 2020-02-06 DIAGNOSIS — F52.31 FEMALE ORGASMIC DISORDER: ICD-10-CM

## 2020-02-06 DIAGNOSIS — Z12.11 ENCOUNTER FOR SCREENING FOR MALIGNANT NEOPLASM OF COLON: ICD-10-CM

## 2020-02-06 DIAGNOSIS — O28.9 UNSPECIFIED ABNORMAL FINDINGS ON ANTENATAL SCREENING OF MOTHER: ICD-10-CM

## 2020-02-06 DIAGNOSIS — B97.7 PAPILLOMAVIRUS AS THE CAUSE OF DISEASES CLASSIFIED ELSEWHERE: ICD-10-CM

## 2020-02-06 LAB
DATE COLLECTED: NORMAL
HEMOCCULT SP1 STL QL: NEGATIVE
QUALITY CONTROL: YES

## 2020-02-06 PROCEDURE — 82270 OCCULT BLOOD FECES: CPT

## 2020-02-06 PROCEDURE — 99396 PREV VISIT EST AGE 40-64: CPT

## 2020-02-08 PROBLEM — F52.31 ANORGASMIA OF FEMALE: Status: ACTIVE | Noted: 2020-02-08

## 2020-02-08 LAB — HPV HIGH+LOW RISK DNA PNL CVX: NOT DETECTED

## 2020-02-08 NOTE — DISCUSSION/SUMMARY
[FreeTextEntry1] : 44 year old  2 para 2 is here for an annual exam.  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 \par \par Ms. JARRETT had her breast imaging on 2019.  There were no masses or any axillary lymphadenopathy appreciated on palpation.  No skin color changes, dimpling or retraction were seen.    A referral for mammogram for 2020 was provided.  Continued monthly self breast examination was advised.\par \par The importance of consuming adequate daily calcium, vitamin D3 combined with weight bearing exercise for bone protection was underscored.\par \par Ms. JARRETT presented on 2019 for increased cramping, heavy menstrual periods and left lower lower quadrant pain that was "excruciating contraction-like" pain.  She deferred going to the emergency room and took ibuprofen.  She had a pelvic ultrasound which showed an 8 cm degenerating fibroid.  She had a consultation with Dr. Mcdaniel on 2019 during which time, her the elevation of her prolactin levels were discussed and she was sent to see endocrinology.  She was diagnosed with a microadenoma.\par \par MRI of the abdomen and pelvis done on 2019 displayed a 10.4 cm left uterine body T2 intermediate signal mass with irregular cystic and enhancing septal components, may reflect typical cystic degeneration of fibroid, malignancy is not excluded.  She had an endometrial biopsy performed by Dr. Mcdaniel on 9/10/2019 and was sent to Dr. Regino Morales, whom she saw on 2019.\par \par She underwent a robotic-assisted total hysterectomy, bilateral salpingectomy, pelvic washings with frozen section for a uterine mass on 2019 at [facility].  It was noted that she understood and accepted the rationale for this recommendation and also understood the serious impact that these findings could have upon her prognosis for survival.  The intraoperative findings of a 17 week size uterus with large degenerating uterine mass with benign frozen section, normal tubes and ovaries, normal upper abdomen, no lymphadenopathy and no gross evidence of a metastatic process were explained to the patient.\par \par Today, however she has many inquiries about the rationale for the procedure.  She states "it doesn't feel the same" and she is not able to enjoy intercourse or achieve orgasm.   Over 25 minutes was spent answering all of her questions and addressing all of her concerns regarding her procedure.  Emotional support was offered and reassurance was given.\par

## 2020-02-08 NOTE — HISTORY OF PRESENT ILLNESS
[1 Year Ago] : 1 year ago [Good] : being in good health [Healthy Diet] : a healthy diet [Regular Exercise] : regular exercise [Last Pap Smear ___] : last Papanicolaou cytology done [unfilled] [Last HPV Screen ___] : last human papilloma virus screening done [unfilled] [Last Mammogram ___] : last mammogram done [unfilled] [Reproductive Age] : is of reproductive age [Definite:  ___ (Date)] : the last menstrual period was [unfilled] [Menarche Age: ____] : age at menarche was [unfilled] [Sexually Active] : is sexually active [Monogamous] : is monogamous [Male ___] : [unfilled] male [Burning] : no burning [Itching] : no itching [Mass] : no mass [Stinging] : no stinging [Lesion] : no lesion [Soreness] : no soreness [Discharge] : no discharge [Galactorrhea] : no galactorrhea [Localized Pain] : no localized pain [Mass (___cm)] : no palpable mass [Diffused Pain] : no diffused pain [Nipple Discharge] : no nipple discharge [Skin Color Change] : no skin color change [Hot Flashes] : no hot flashes [Night Sweats] : no night sweats [Vaginal Itching] : no vaginal itching [Dyspareunia] : no dyspareunia [Mood Changes] : no mood changes

## 2020-02-08 NOTE — PHYSICAL EXAM
[Awake] : awake [Alert] : alert [Soft] : soft [Oriented x3] : oriented to person, place, and time [Labia Majora] : labia major [Labia Minora] : labia minora [Normal] : clitoris [No Bleeding] : there was no active vaginal bleeding [Absent] : absent [Uterine Adnexae] : were not tender and not enlarged [No Tenderness] : no rectal tenderness [Nl Sphincter Tone] : normal sphincter tone [RRR, No Murmurs] : RRR, no murmurs [CTAB] : CTAB [Acute Distress] : no acute distress [LAD] : no lymphadenopathy [Thyroid Nodule] : no thyroid nodule [Goiter] : no goiter [Mass] : no breast mass [Nipple Discharge] : no nipple discharge [Axillary LAD] : no axillary lymphadenopathy [Tender] : non tender [Occult Blood] : occult blood test from digital rectal exam was negative

## 2020-02-20 ENCOUNTER — APPOINTMENT (OUTPATIENT)
Dept: OBGYN | Facility: CLINIC | Age: 45
End: 2020-02-20

## 2020-03-20 PROBLEM — R10.2 PELVIC CRAMPING: Status: ACTIVE | Noted: 2020-03-20

## 2020-03-20 PROBLEM — E22.1 HYPERPROLACTINEMIA: Status: ACTIVE | Noted: 2019-08-21

## 2020-04-04 NOTE — PATIENT PROFILE ADULT - STATED REASON FOR ADMISSION
This was an emergent procedure. This was an emergent procedure. This was an emergent procedure. hysterectomy

## 2020-04-19 PROBLEM — Z98.890 POSTOPERATIVE STATE: Status: ACTIVE | Noted: 2020-04-19

## 2020-04-19 NOTE — CHIEF COMPLAINT
[Follow Up] : follow up GYN visit [FreeTextEntry1] : F/U. \par PARTIAL HYSTERECTOMY ON 09/2019 .\par

## 2020-04-19 NOTE — PHYSICAL EXAM
[Awake] : awake [Alert] : alert [Acute Distress] : no acute distress [Soft] : soft [Tender] : non tender [Distended] : not distended [Oriented x3] : oriented to person, place, and time [Depressed Mood] : not depressed [RRR, No Murmurs] : RRR, no murmurs [CTAB] : CTAB no

## 2020-06-08 ENCOUNTER — OUTPATIENT (OUTPATIENT)
Dept: OUTPATIENT SERVICES | Facility: HOSPITAL | Age: 45
LOS: 1 days | End: 2020-06-08
Payer: COMMERCIAL

## 2020-06-08 ENCOUNTER — APPOINTMENT (OUTPATIENT)
Dept: MRI IMAGING | Facility: HOSPITAL | Age: 45
End: 2020-06-08
Payer: COMMERCIAL

## 2020-06-08 DIAGNOSIS — E22.1 HYPERPROLACTINEMIA: ICD-10-CM

## 2020-06-08 PROCEDURE — A9579: CPT

## 2020-06-08 PROCEDURE — 70553 MRI BRAIN STEM W/O & W/DYE: CPT | Mod: 26

## 2020-06-08 PROCEDURE — 70553 MRI BRAIN STEM W/O & W/DYE: CPT

## 2020-10-18 NOTE — DISCHARGE NOTE NURSING/CASE MANAGEMENT/SOCIAL WORK - NSDCPETBCESMAN_GEN_ALL_CORE
If you are a smoker, it is important for your health to stop smoking. Please be aware that second hand smoke is also harmful.
152.4

## 2020-12-23 PROBLEM — Z01.419 ENCOUNTER FOR ANNUAL ROUTINE GYNECOLOGICAL EXAMINATION: Status: RESOLVED | Noted: 2020-02-06 | Resolved: 2020-12-23

## 2021-07-01 ENCOUNTER — APPOINTMENT (OUTPATIENT)
Dept: OBGYN | Facility: CLINIC | Age: 46
End: 2021-07-01
Payer: COMMERCIAL

## 2021-07-01 VITALS
HEIGHT: 70 IN | BODY MASS INDEX: 20.76 KG/M2 | WEIGHT: 145 LBS | TEMPERATURE: 97.5 F | DIASTOLIC BLOOD PRESSURE: 68 MMHG | SYSTOLIC BLOOD PRESSURE: 112 MMHG

## 2021-07-01 DIAGNOSIS — Z12.4 ENCOUNTER FOR SCREENING FOR MALIGNANT NEOPLASM OF CERVIX: ICD-10-CM

## 2021-07-01 DIAGNOSIS — Z87.42 PERSONAL HISTORY OF OTHER DISEASES OF THE FEMALE GENITAL TRACT: ICD-10-CM

## 2021-07-01 PROCEDURE — 99072 ADDL SUPL MATRL&STAF TM PHE: CPT

## 2021-07-01 PROCEDURE — 99396 PREV VISIT EST AGE 40-64: CPT

## 2021-07-01 NOTE — HISTORY OF PRESENT ILLNESS
[LMP unknown] : LMP unknown [N] : Patient reports normal menses [Y] : Positive pregnancy history [unknown] : Patient is unsure of the date of her LMP [Currently Active] : currently active [Men] : men [Vaginal] : vaginal [No] : No [TextBox_4] : Soheila is here for her annual exam.  \par She is 2 years s/p OLEG/BS for a degenerating fibroid\par \par For the year after her surgery she felt her libido and overall sense of well being was different.  She is feeling better now.   [TextBox_19] : br 1 [Mammogramdate] : 5/15/21 [PapSmeardate] : 2/6/20 [TextBox_31] : neg [ColonoscopyDate] : 2019 [PGHxTotal] : 2 [[facility]] : 2 [[facility]Living] : 2

## 2021-07-01 NOTE — PHYSICAL EXAM
[Appropriately responsive] : appropriately responsive [Alert] : alert [No Acute Distress] : no acute distress [Soft] : soft [Non-tender] : non-tender [Non-distended] : non-distended [No Lesions] : no lesions [No Mass] : no mass [Oriented x3] : oriented x3 [Examination Of The Breasts] : a normal appearance [No Masses] : no breast masses were palpable [Labia Majora] : normal [Labia Minora] : normal [Normal] : normal [No Bleeding] : There was no active vaginal bleeding [Absent] : absent [Uterine Adnexae] : non-palpable

## 2021-07-07 LAB
CYTOLOGY CVX/VAG DOC THIN PREP: NORMAL
HPV HIGH+LOW RISK DNA PNL CVX: NOT DETECTED

## 2021-08-20 ENCOUNTER — APPOINTMENT (OUTPATIENT)
Dept: MRI IMAGING | Facility: CLINIC | Age: 46
End: 2021-08-20
Payer: COMMERCIAL

## 2021-08-20 ENCOUNTER — OUTPATIENT (OUTPATIENT)
Dept: OUTPATIENT SERVICES | Facility: HOSPITAL | Age: 46
LOS: 1 days | End: 2021-08-20
Payer: COMMERCIAL

## 2021-08-20 DIAGNOSIS — Z00.8 ENCOUNTER FOR OTHER GENERAL EXAMINATION: ICD-10-CM

## 2021-08-20 PROCEDURE — 70553 MRI BRAIN STEM W/O & W/DYE: CPT | Mod: 26

## 2021-08-20 PROCEDURE — 70553 MRI BRAIN STEM W/O & W/DYE: CPT

## 2022-06-29 NOTE — REVIEW OF SYSTEMS
Patient is calling regarding a colonoscopy.  He remembers talking to Dr. Orantes about it at his last visit on 5/4/2022.  He was wanting to get this done and could Dr. Orantes put in a referral for it.  Please advise.  
Spoke with patient and let him know referral has been entered. He was transferred to schedule.     He asked about how labs from 4/26/22 were coded. Let him know they were coded as Diagnoses :Laboratory examination ordered as part of a routine general medical examination [Z00.00] He verbalized understanding.  
[Nl] : Integumentary

## 2022-09-16 ENCOUNTER — APPOINTMENT (OUTPATIENT)
Dept: OBGYN | Facility: CLINIC | Age: 47
End: 2022-09-16

## 2022-09-16 VITALS
WEIGHT: 140 LBS | BODY MASS INDEX: 20.04 KG/M2 | DIASTOLIC BLOOD PRESSURE: 98 MMHG | SYSTOLIC BLOOD PRESSURE: 118 MMHG | HEIGHT: 70 IN

## 2022-09-16 DIAGNOSIS — R10.32 LEFT LOWER QUADRANT PAIN: ICD-10-CM

## 2022-09-16 PROCEDURE — 99214 OFFICE O/P EST MOD 30 MIN: CPT

## 2022-09-16 RX ORDER — ALPRAZOLAM 0.5 MG/1
0.5 TABLET ORAL 3 TIMES DAILY
Qty: 10 | Refills: 0 | Status: DISCONTINUED | COMMUNITY
Start: 2019-09-13 | End: 2022-09-16

## 2022-09-16 RX ORDER — ALPRAZOLAM 0.5 MG/1
0.5 TABLET ORAL
Qty: 4 | Refills: 0 | Status: DISCONTINUED | COMMUNITY
Start: 2019-08-28 | End: 2022-09-16

## 2022-09-16 NOTE — DISCUSSION/SUMMARY
[FreeTextEntry1] : - s/p OLEG BS \par - unremarkable abdominal and pelvic exam \par - discussed role for TVUS to evaluate for possible ovarian, cuff, or pelvic related MSK/GYN abnormalities, she agreed \par \par TVUS ordered. \par Discussed possible results and possible further warranted investigation including other imaging or labwork, she verbalized understanding. \par \par She verbalized understanding and agreed with above plan. \par She was given time for questions/concerns which were all answered to her apparent satisfaction. \par \par RTO 3w for sono results and discussion/reassessment of symptoms.

## 2022-09-16 NOTE — HISTORY OF PRESENT ILLNESS
[N] : Patient does not use contraception [Menarche Age: ____] : age at menarche was [unfilled] [Currently Active] : currently active [Y] : Patient is sexually active [Mammogramdate] : 08/05/21 [TextBox_19] : BR1 [BreastSonogramDate] : 08/05/21 [TextBox_25] : BR2 [PapSmeardate] : 07/01/21 [TextBox_31] : NEG [HPVDate] : 07/01/21 [TextBox_78] : NEG [LMPDate] : 2019 [PGHxTotal] : 2 [[facility]Full[facility]] : 2 [[facility]xLiving] : 2 [FreeTextEntry1] : 2019

## 2022-09-16 NOTE — PHYSICAL EXAM
[Chaperone Present] : A chaperone was present in the examining room during all aspects of the physical examination [Appropriately responsive] : appropriately responsive [Alert] : alert [No Acute Distress] : no acute distress [Soft] : soft [Non-tender] : non-tender [Non-distended] : non-distended [Oriented x3] : oriented x3 [Labia Majora] : normal [Labia Minora] : normal [Normal] : normal [Absent] : absent [Uterine Adnexae] : normal [FreeTextEntry1] : MEGAN [place] [FreeTextEntry4] : cuff healed well, no masses/lesions [FreeTextEntry6] : nontender

## 2022-09-24 ENCOUNTER — OUTPATIENT (OUTPATIENT)
Dept: OUTPATIENT SERVICES | Facility: HOSPITAL | Age: 47
LOS: 1 days | End: 2022-09-24
Payer: COMMERCIAL

## 2022-09-24 ENCOUNTER — APPOINTMENT (OUTPATIENT)
Dept: ULTRASOUND IMAGING | Facility: CLINIC | Age: 47
End: 2022-09-24

## 2022-09-24 DIAGNOSIS — R10.32 LEFT LOWER QUADRANT PAIN: ICD-10-CM

## 2022-09-24 PROCEDURE — 76830 TRANSVAGINAL US NON-OB: CPT

## 2022-09-24 PROCEDURE — 76856 US EXAM PELVIC COMPLETE: CPT | Mod: 26

## 2022-09-24 PROCEDURE — 76830 TRANSVAGINAL US NON-OB: CPT | Mod: 26

## 2022-09-24 PROCEDURE — 76856 US EXAM PELVIC COMPLETE: CPT

## 2022-10-15 ENCOUNTER — APPOINTMENT (OUTPATIENT)
Dept: OBGYN | Facility: CLINIC | Age: 47
End: 2022-10-15

## 2022-10-15 ENCOUNTER — NON-APPOINTMENT (OUTPATIENT)
Age: 47
End: 2022-10-15

## 2022-10-15 VITALS
HEIGHT: 70 IN | WEIGHT: 144 LBS | SYSTOLIC BLOOD PRESSURE: 122 MMHG | BODY MASS INDEX: 20.62 KG/M2 | DIASTOLIC BLOOD PRESSURE: 68 MMHG

## 2022-10-15 PROCEDURE — 99396 PREV VISIT EST AGE 40-64: CPT

## 2022-10-15 NOTE — PLAN
[FreeTextEntry1] : \par -Mammo/sono script sent for dense breasts\par -Pelvic exam performed / no pap per guidelines/no cervix vaginal pap last year normal. \par -Patient to follow up in a year for annual exam if no concerns \par \par Long discussion with Soheila about her feelings surrounding the loss of her cervix, motivation for her decision, and current sexual sensations.  Reassured her that her feelings are normal, and she should explore them more with a therapist as she thinks about the loss very often. We discussed her feelings of regret about her decision may also be affecting her intimacy/sensation during sex.  We also discussed changes that may occur in perimenopause and menopause, and that changes in sexual function and the body can also prompt a sense of loss in some women.  She was provided with information to the women's therapy center for more support.

## 2022-10-15 NOTE — REVIEW OF SYSTEMS
[Negative] : Psychiatric [Breast Pain] : no breast pain [Breast Lump] : no breast lump [Nipple Changes] : no nipple changes [Nipple Discharge] : no nipple discharge

## 2022-10-15 NOTE — HISTORY OF PRESENT ILLNESS
[N] : Patient does not use contraception [Y] : Positive pregnancy history [Menarche Age: ____] : age at menarche was [unfilled] [Currently Active] : currently active [Patient reported colonoscopy was normal] : Patient reported colonoscopy was normal [TextBox_4] : Soheila presents to the office today for her annual GYN visits. Patient denies any GYN symptoms: discharge, pain or discomfort at this time. Patient has a history of total hysterectomy for a large fibroid- pathology benign. \par \par She expresses sadness/frustration with herself, some disappointment about the loss of her cervix.  She feels she made a quick decision at the time of her surgical planning for her hysterectomy and feels she was motivated by fear at that time/wanted to have her cervix removed in order to reduce her cervical cancer risk.  She now has deep regret about this/feels the loss of her cervix and hysterectomy in general has changed her and changed her sexual sensation.  She has never discussed this before.\par  [Mammogramdate] : 8/05/21 [TextBox_19] : BR1 [BreastSonogramDate] : 8/05/21 [TextBox_25] : BR2 [TextBox_31] : NEG [PapSmeardate] : 7/01/21 [ColonoscopyDate] : 2017 [TextBox_43] : PER PT [TextBox_78] : NEG [HPVDate] : 7/01/21 [LMPDate] : 2019 [PGHxTotal] : 2 [[facility]alexLiving] : 2 [[facility]xFullTerm] : 2 [FreeTextEntry1] : 2019

## 2022-10-15 NOTE — PHYSICAL EXAM
[Chaperone Present] : A chaperone was present in the examining room during all aspects of the physical examination [Appropriately responsive] : appropriately responsive [No Acute Distress] : no acute distress [Alert] : alert [No Lymphadenopathy] : no lymphadenopathy [Non-tender] : non-tender [Non-distended] : non-distended [Oriented x3] : oriented x3 [Examination Of The Breasts] : a normal appearance [No Discharge] : no discharge [No Masses] : no breast masses were palpable [Labia Majora] : normal [Labia Minora] : normal [Enlarged] : enlarged [Normal] : normal [Absent] : absent [Uterine Adnexae] : non-palpable [Soft] : soft

## 2022-11-25 ENCOUNTER — OFFICE (OUTPATIENT)
Dept: URBAN - METROPOLITAN AREA CLINIC 12 | Facility: CLINIC | Age: 47
Setting detail: OPHTHALMOLOGY
End: 2022-11-25
Payer: COMMERCIAL

## 2022-11-25 DIAGNOSIS — D35.2: ICD-10-CM

## 2022-11-25 DIAGNOSIS — H52.03: ICD-10-CM

## 2022-11-25 DIAGNOSIS — H40.013: ICD-10-CM

## 2022-11-25 DIAGNOSIS — H11.32: ICD-10-CM

## 2022-11-25 PROCEDURE — 92083 EXTENDED VISUAL FIELD XM: CPT | Performed by: STUDENT IN AN ORGANIZED HEALTH CARE EDUCATION/TRAINING PROGRAM

## 2022-11-25 PROCEDURE — 92015 DETERMINE REFRACTIVE STATE: CPT | Performed by: STUDENT IN AN ORGANIZED HEALTH CARE EDUCATION/TRAINING PROGRAM

## 2022-11-25 PROCEDURE — 92004 COMPRE OPH EXAM NEW PT 1/>: CPT | Performed by: STUDENT IN AN ORGANIZED HEALTH CARE EDUCATION/TRAINING PROGRAM

## 2022-11-25 PROCEDURE — 92133 CPTRZD OPH DX IMG PST SGM ON: CPT | Performed by: STUDENT IN AN ORGANIZED HEALTH CARE EDUCATION/TRAINING PROGRAM

## 2022-11-25 ASSESSMENT — LID EXAM ASSESSMENTS
OS_COMMENTS: NO FB FOUND
OS_COMMENTS: UPPER LID EVERTED

## 2022-11-25 ASSESSMENT — CONFRONTATIONAL VISUAL FIELD TEST (CVF)
OD_FINDINGS: FULL
OS_FINDINGS: FULL

## 2022-11-25 ASSESSMENT — TONOMETRY
OD_IOP_MMHG: 12
OS_IOP_MMHG: 12

## 2022-11-30 PROBLEM — H52.7 REFRACTIVE ERROR ; BOTH EYES: Status: ACTIVE | Noted: 2022-11-25

## 2022-11-30 ASSESSMENT — REFRACTION_CURRENTRX
OD_AXIS: 111
OS_CYLINDER: -1.00
OS_VPRISM_DIRECTION: SV
OD_SPHERE: +0.25
OS_OVR_VA: 20/
OD_VPRISM_DIRECTION: SV
OD_OVR_VA: 20/
OS_AXIS: 62
OS_SPHERE: +0.50
OD_CYLINDER: -0.75

## 2022-11-30 ASSESSMENT — REFRACTION_MANIFEST
OS_VA1: 20/20
OD_AXIS: 115
OD_CYLINDER: -0.75
OD_CYLINDER: -0.75
OS_SPHERE: +0.75
OS_CYLINDER: -1.00
OD_VA1: 20/20
OU_VA: 20/20
OS_AXIS: 060
OS_AXIS: 060
OD_SPHERE: +1.00
OS_VA1: 20/20
OD_VA1: 20/20
OU_VA: 20/20
OD_ADD: +1.25
OS_CYLINDER: -1.00
OS_ADD: +1.25
OS_SPHERE: +1.00
OD_AXIS: 115
OD_SPHERE: +0.75

## 2022-11-30 ASSESSMENT — REFRACTION_AUTOREFRACTION
OS_AXIS: 66
OD_SPHERE: +0.50
OD_AXIS: 108
OS_CYLINDER: -1.75
OD_CYLINDER: -1.00
OS_SPHERE: +0.75

## 2022-11-30 ASSESSMENT — SPHEQUIV_DERIVED
OS_SPHEQUIV: -0.125
OD_SPHEQUIV: 0.625
OS_SPHEQUIV: 0.5
OD_SPHEQUIV: 0
OS_SPHEQUIV: 0.25
OD_SPHEQUIV: 0.375

## 2022-11-30 ASSESSMENT — VISUAL ACUITY
OD_BCVA: 20/30-2
OS_BCVA: 20/20

## 2023-02-13 ENCOUNTER — APPOINTMENT (OUTPATIENT)
Dept: ORTHOPEDIC SURGERY | Facility: CLINIC | Age: 48
End: 2023-02-13

## 2023-03-04 ENCOUNTER — RX ONLY (RX ONLY)
Age: 48
End: 2023-03-04

## 2023-03-04 ENCOUNTER — OFFICE (OUTPATIENT)
Dept: URBAN - METROPOLITAN AREA CLINIC 100 | Facility: CLINIC | Age: 48
Setting detail: OPHTHALMOLOGY
End: 2023-03-04
Payer: COMMERCIAL

## 2023-03-04 DIAGNOSIS — H01.001: ICD-10-CM

## 2023-03-04 DIAGNOSIS — H01.004: ICD-10-CM

## 2023-03-04 DIAGNOSIS — H40.013: ICD-10-CM

## 2023-03-04 DIAGNOSIS — H00.14: ICD-10-CM

## 2023-03-04 PROCEDURE — 99213 OFFICE O/P EST LOW 20 MIN: CPT | Performed by: OPHTHALMOLOGY

## 2023-03-04 ASSESSMENT — REFRACTION_CURRENTRX
OS_OVR_VA: 20/
OD_SPHERE: +0.25
OS_VPRISM_DIRECTION: SV
OD_CYLINDER: -0.75
OS_CYLINDER: -1.00
OS_SPHERE: +0.50
OS_AXIS: 62
OD_VPRISM_DIRECTION: SV
OD_OVR_VA: 20/
OD_AXIS: 111

## 2023-03-04 ASSESSMENT — REFRACTION_AUTOREFRACTION
OD_CYLINDER: -1.25
OS_SPHERE: +1.00
OS_AXIS: 072
OD_AXIS: 111
OS_CYLINDER: -1.75
OD_SPHERE: +0.50

## 2023-03-04 ASSESSMENT — AXIALLENGTH_DERIVED
OD_AL: 23.1885
OD_AL: 23.0018
OD_AL: 22.9096
OS_AL: 23.0948
OS_AL: 23.0482
OS_AL: 22.9556

## 2023-03-04 ASSESSMENT — KERATOMETRY
METHOD_AUTO_MANUAL: AUTO
OS_K1POWER_DIOPTERS: 44.00
OD_AXISANGLE_DEGREES: 030
OD_K1POWER_DIOPTERS: 44.25
OD_K2POWER_DIOPTERS: 45.25
OS_AXISANGLE_DEGREES: 156
OS_K2POWER_DIOPTERS: 45.50

## 2023-03-04 ASSESSMENT — LID EXAM ASSESSMENTS
OD_BLEPHARITIS: RUL 1+
OS_BLEPHARITIS: LUL 1+

## 2023-03-04 ASSESSMENT — REFRACTION_MANIFEST
OD_ADD: +1.25
OS_SPHERE: +1.00
OD_SPHERE: +1.00
OD_CYLINDER: -0.75
OS_CYLINDER: -1.00
OS_AXIS: 060
OS_VA1: 20/20
OS_CYLINDER: -1.00
OD_CYLINDER: -0.75
OU_VA: 20/20
OD_AXIS: 115
OS_VA1: 20/20
OS_ADD: +1.25
OD_VA1: 20/20
OD_AXIS: 115
OS_AXIS: 060
OD_VA1: 20/20
OU_VA: 20/20
OS_SPHERE: +0.75
OD_SPHERE: +0.75

## 2023-03-04 ASSESSMENT — SPHEQUIV_DERIVED
OS_SPHEQUIV: 0.25
OS_SPHEQUIV: 0.125
OD_SPHEQUIV: -0.125
OD_SPHEQUIV: 0.625
OS_SPHEQUIV: 0.5
OD_SPHEQUIV: 0.375

## 2023-03-04 ASSESSMENT — CONFRONTATIONAL VISUAL FIELD TEST (CVF)
OD_FINDINGS: FULL
OS_FINDINGS: FULL

## 2023-03-04 ASSESSMENT — TONOMETRY
OD_IOP_MMHG: 10
OS_IOP_MMHG: 13

## 2023-03-04 ASSESSMENT — VISUAL ACUITY
OD_BCVA: 20/30-2
OS_BCVA: 20/20

## 2023-03-11 ENCOUNTER — TRANSCRIPTION ENCOUNTER (OUTPATIENT)
Age: 48
End: 2023-03-11

## 2023-08-18 ENCOUNTER — OUTPATIENT (OUTPATIENT)
Dept: OUTPATIENT SERVICES | Facility: HOSPITAL | Age: 48
LOS: 1 days | End: 2023-08-18
Payer: COMMERCIAL

## 2023-08-18 ENCOUNTER — APPOINTMENT (OUTPATIENT)
Dept: MRI IMAGING | Facility: CLINIC | Age: 48
End: 2023-08-18
Payer: COMMERCIAL

## 2023-08-18 DIAGNOSIS — D35.2 BENIGN NEOPLASM OF PITUITARY GLAND: ICD-10-CM

## 2023-08-18 PROCEDURE — 70553 MRI BRAIN STEM W/O & W/DYE: CPT | Mod: 26

## 2023-08-18 PROCEDURE — A9585: CPT

## 2023-08-18 PROCEDURE — 70553 MRI BRAIN STEM W/O & W/DYE: CPT

## 2023-10-21 ENCOUNTER — APPOINTMENT (OUTPATIENT)
Dept: OBGYN | Facility: CLINIC | Age: 48
End: 2023-10-21

## 2023-12-22 ENCOUNTER — APPOINTMENT (OUTPATIENT)
Dept: OBGYN | Facility: CLINIC | Age: 48
End: 2023-12-22

## 2023-12-23 ENCOUNTER — OFFICE (OUTPATIENT)
Dept: URBAN - METROPOLITAN AREA CLINIC 12 | Facility: CLINIC | Age: 48
Setting detail: OPHTHALMOLOGY
End: 2023-12-23
Payer: COMMERCIAL

## 2023-12-23 DIAGNOSIS — H01.004: ICD-10-CM

## 2023-12-23 DIAGNOSIS — H40.013: ICD-10-CM

## 2023-12-23 DIAGNOSIS — H01.001: ICD-10-CM

## 2023-12-23 DIAGNOSIS — D35.2: ICD-10-CM

## 2023-12-23 PROCEDURE — 92083 EXTENDED VISUAL FIELD XM: CPT | Performed by: OPHTHALMOLOGY

## 2023-12-23 PROCEDURE — 92133 CPTRZD OPH DX IMG PST SGM ON: CPT | Performed by: OPHTHALMOLOGY

## 2023-12-23 PROCEDURE — 99213 OFFICE O/P EST LOW 20 MIN: CPT | Performed by: OPHTHALMOLOGY

## 2023-12-23 ASSESSMENT — REFRACTION_MANIFEST
OS_SPHERE: +1.00
OS_SPHERE: +0.75
OD_CYLINDER: -0.75
OD_AXIS: 115
OD_SPHERE: +1.00
OS_VA1: 20/20
OD_AXIS: 115
OS_ADD: +1.25
OS_CYLINDER: -1.00
OD_SPHERE: +0.75
OU_VA: 20/20
OU_VA: 20/20
OS_VA1: 20/20
OS_AXIS: 060
OS_CYLINDER: -1.00
OD_VA1: 20/20
OD_ADD: +1.25
OD_VA1: 20/20
OD_CYLINDER: -0.75
OS_AXIS: 060

## 2023-12-23 ASSESSMENT — REFRACTION_AUTOREFRACTION
OS_SPHERE: +1.00
OS_AXIS: 062
OD_SPHERE: +0.25
OS_CYLINDER: -1.75
OD_AXIS: 106
OD_CYLINDER: -1.00

## 2023-12-23 ASSESSMENT — SPHEQUIV_DERIVED
OS_SPHEQUIV: 0.25
OS_SPHEQUIV: 0.5
OD_SPHEQUIV: 0.625
OD_SPHEQUIV: 0.375
OD_SPHEQUIV: -0.25
OS_SPHEQUIV: 0.125

## 2023-12-23 ASSESSMENT — REFRACTION_CURRENTRX
OS_VPRISM_DIRECTION: SV
OD_AXIS: 111
OS_OVR_VA: 20/
OD_SPHERE: +0.25
OD_CYLINDER: -0.75
OD_VPRISM_DIRECTION: SV
OS_AXIS: 62
OD_OVR_VA: 20/
OS_CYLINDER: -1.00
OS_SPHERE: +0.50

## 2023-12-23 ASSESSMENT — LID EXAM ASSESSMENTS
OD_BLEPHARITIS: RUL 1+
OS_BLEPHARITIS: LUL 1+

## 2023-12-23 ASSESSMENT — CONFRONTATIONAL VISUAL FIELD TEST (CVF)
OD_FINDINGS: FULL
OS_FINDINGS: FULL

## 2024-01-03 ENCOUNTER — APPOINTMENT (OUTPATIENT)
Dept: OBGYN | Facility: CLINIC | Age: 49
End: 2024-01-03
Payer: COMMERCIAL

## 2024-01-03 VITALS
WEIGHT: 144 LBS | BODY MASS INDEX: 20.62 KG/M2 | DIASTOLIC BLOOD PRESSURE: 70 MMHG | SYSTOLIC BLOOD PRESSURE: 120 MMHG | HEIGHT: 70 IN

## 2024-01-03 DIAGNOSIS — Z12.39 ENCOUNTER FOR OTHER SCREENING FOR MALIGNANT NEOPLASM OF BREAST: ICD-10-CM

## 2024-01-03 DIAGNOSIS — R92.30 DENSE BREASTS, UNSPECIFIED: ICD-10-CM

## 2024-01-03 DIAGNOSIS — Z01.419 ENCOUNTER FOR GYNECOLOGICAL EXAMINATION (GENERAL) (ROUTINE) W/OUT ABNORMAL FINDINGS: ICD-10-CM

## 2024-01-03 DIAGNOSIS — R35.0 FREQUENCY OF MICTURITION: ICD-10-CM

## 2024-01-03 LAB
APPEARANCE: CLEAR
BILIRUBIN URINE: NEGATIVE
BLOOD URINE: ABNORMAL
COLOR: YELLOW
GLUCOSE QUALITATIVE U: NEGATIVE
KETONES URINE: NEGATIVE
LEUKOCYTE ESTERASE URINE: NEGATIVE
NITRITE URINE: NEGATIVE
PH URINE: 7
PROTEIN URINE: NEGATIVE
SPECIFIC GRAVITY URINE: <=1.005
UROBILINOGEN URINE: 0.2 (ref 0.2–?)

## 2024-01-03 PROCEDURE — 99396 PREV VISIT EST AGE 40-64: CPT

## 2024-01-05 ENCOUNTER — TRANSCRIPTION ENCOUNTER (OUTPATIENT)
Age: 49
End: 2024-01-05

## 2024-01-05 LAB
APPEARANCE: CLEAR
BACTERIA: NEGATIVE /HPF
BILIRUBIN URINE: NEGATIVE
BLOOD URINE: NEGATIVE
CAST: 0 /LPF
COLOR: YELLOW
EPITHELIAL CELLS: 3 /HPF
GLUCOSE QUALITATIVE U: NEGATIVE MG/DL
KETONES URINE: NEGATIVE MG/DL
LEUKOCYTE ESTERASE URINE: NEGATIVE
MICROSCOPIC-UA: NORMAL
NITRITE URINE: NEGATIVE
PH URINE: 7
PROTEIN URINE: NEGATIVE MG/DL
RED BLOOD CELLS URINE: 1 /HPF
SPECIFIC GRAVITY URINE: 1.01
UROBILINOGEN URINE: 0.2 MG/DL
WHITE BLOOD CELLS URINE: 0 /HPF

## 2024-02-02 LAB — BACTERIA UR CULT: NORMAL

## 2024-02-14 ENCOUNTER — OFFICE (OUTPATIENT)
Dept: URBAN - METROPOLITAN AREA CLINIC 100 | Facility: CLINIC | Age: 49
Setting detail: OPHTHALMOLOGY
End: 2024-02-14
Payer: COMMERCIAL

## 2024-02-14 DIAGNOSIS — H00.11: ICD-10-CM

## 2024-02-14 PROBLEM — H01.00 BLEPHARITIS; RIGHT UPPER LID, LEFT UPPER LID: Status: ACTIVE | Noted: 2024-02-14

## 2024-02-14 PROCEDURE — 99213 OFFICE O/P EST LOW 20 MIN: CPT | Performed by: OPHTHALMOLOGY

## 2024-02-14 ASSESSMENT — REFRACTION_MANIFEST
OD_SPHERE: +1.00
OD_CYLINDER: -0.75
OD_SPHERE: +0.75
OU_VA: 20/20
OS_VA1: 20/20
OU_VA: 20/20
OS_CYLINDER: -1.00
OD_AXIS: 115
OS_VA1: 20/20
OD_VA1: 20/20
OD_VA1: 20/20
OS_AXIS: 060
OD_ADD: +1.25
OS_SPHERE: +1.00
OS_CYLINDER: -1.00
OD_CYLINDER: -0.75
OS_SPHERE: +0.75
OS_ADD: +1.25
OS_AXIS: 060
OD_AXIS: 115

## 2024-02-14 ASSESSMENT — REFRACTION_CURRENTRX
OD_OVR_VA: 20/
OS_SPHERE: +0.50
OS_VPRISM_DIRECTION: SV
OD_AXIS: 111
OS_OVR_VA: 20/
OS_CYLINDER: -1.00
OD_CYLINDER: -0.75
OD_SPHERE: +0.25
OD_VPRISM_DIRECTION: SV
OS_AXIS: 62

## 2024-02-14 ASSESSMENT — REFRACTION_AUTOREFRACTION
OS_SPHERE: +1.25
OD_AXIS: 107
OS_CYLINDER: -2.00
OD_SPHERE: +0.25
OD_CYLINDER: -1.00
OS_AXIS: 069

## 2024-02-14 ASSESSMENT — SPHEQUIV_DERIVED
OS_SPHEQUIV: 0.25
OD_SPHEQUIV: 0.375
OD_SPHEQUIV: -0.25
OS_SPHEQUIV: 0.25
OD_SPHEQUIV: 0.625
OS_SPHEQUIV: 0.5

## 2024-02-14 ASSESSMENT — LID EXAM ASSESSMENTS
OD_BLEPHARITIS: RUL 1+
OS_BLEPHARITIS: LUL 1+

## 2024-02-14 ASSESSMENT — CONFRONTATIONAL VISUAL FIELD TEST (CVF)
OD_FINDINGS: FULL
OS_FINDINGS: FULL

## 2024-02-16 NOTE — PLAN
[FreeTextEntry1] : repeat UA and ucx obtained- will fax results to pcp when they are in, and she can follow up regarding the need for the CT scan.  pap guidelines reviewed/no pap today.  self breast exams encouraged mammo/sono rx provided.  genetic testing discussed given FH of colon ca and her own hx of polyps at a young age.

## 2024-02-16 NOTE — PHYSICAL EXAM
[Chaperone Present] : A chaperone was present in the examining room during all aspects of the physical examination [Appropriately responsive] : appropriately responsive [Alert] : alert [No Acute Distress] : no acute distress [Soft] : soft [Non-tender] : non-tender [Non-distended] : non-distended [No HSM] : No HSM [No Lesions] : no lesions [No Mass] : no mass [Oriented x3] : oriented x3 [Examination Of The Breasts] : a normal appearance [No Masses] : no breast masses were palpable [Labia Majora] : normal [Labia Minora] : normal [Normal] : normal [Absent] : absent [Uterine Adnexae] : non-palpable [FreeTextEntry1] : MATTHEW Alvarado

## 2024-02-16 NOTE — HISTORY OF PRESENT ILLNESS
[LMP unknown] : LMP unknown [postmenopausal] : postmenopausal [N] : Patient does not use contraception [Y] : Positive pregnancy history [unknown] : Patient is unsure of the date of her LMP [Menarche Age: ____] : age at menarche was [unfilled] [No] : Patient does not have concerns regarding sex [Currently Active] : currently active [TextBox_4] : Soheila is here for an annual exam. She was recently treated for a UTI by [facility]. she also followed up with her pcp as she had some hematuria. she was given an rx for a CT scan to r/o a renal stone should the hematuria persist.  She is asymptomatic now, feels well.  Finished abx about 5 days ago.  she is s/p OLEG, sex is still not as it was, but she is living with this.    Of note, hx of colon polyps about 10 years ago, has had 3 colonoscopies since.   [Mammogramdate] : 03/30/23 [TextBox_19] : BR 1 [PapSmeardate] : 07/01/21 [TextBox_31] : neg [HPVDate] : 07/01/21 [TextBox_78] : neg [PGHxTotal] : 2 [[facility]xFull[facility]] : 2 [[facility]xLiving] : 2

## 2024-05-08 ENCOUNTER — APPOINTMENT (OUTPATIENT)
Dept: OBGYN | Facility: CLINIC | Age: 49
End: 2024-05-08

## 2025-01-29 ENCOUNTER — OFFICE (OUTPATIENT)
Dept: URBAN - METROPOLITAN AREA CLINIC 12 | Facility: CLINIC | Age: 50
Setting detail: OPHTHALMOLOGY
End: 2025-01-29
Payer: COMMERCIAL

## 2025-01-29 DIAGNOSIS — H40.013: ICD-10-CM

## 2025-01-29 DIAGNOSIS — H01.001: ICD-10-CM

## 2025-01-29 DIAGNOSIS — D35.2: ICD-10-CM

## 2025-01-29 DIAGNOSIS — H01.004: ICD-10-CM

## 2025-01-29 PROCEDURE — 92083 EXTENDED VISUAL FIELD XM: CPT | Performed by: OPHTHALMOLOGY

## 2025-01-29 PROCEDURE — 92250 FUNDUS PHOTOGRAPHY W/I&R: CPT | Performed by: OPHTHALMOLOGY

## 2025-01-29 PROCEDURE — 92014 COMPRE OPH EXAM EST PT 1/>: CPT | Performed by: OPHTHALMOLOGY

## 2025-01-29 ASSESSMENT — REFRACTION_MANIFEST
OS_SPHERE: +0.75
OD_CYLINDER: -1.00
OU_VA: 20/20
OS_CYLINDER: -1.00
OU_VA: 20/20
OD_SPHERE: +0.50
OD_VA1: 20/20
OD_CYLINDER: -0.75
OD_AXIS: 100
OD_CYLINDER: -0.75
OS_SPHERE: +1.00
OS_CYLINDER: -1.00
OS_AXIS: 070
OS_AXIS: 060
OD_VA1: 20/20
OS_ADD: +1.25
OS_VA1: 20/NI
OS_VA1: 20/20
OS_CYLINDER: -1.75
OS_AXIS: 060
OD_VA1: 20/NI
OD_AXIS: 115
OD_ADD: +1.25
OS_VA1: 20/20
OS_SPHERE: +1.00
OD_SPHERE: +0.75
OD_SPHERE: +1.00
OD_AXIS: 115

## 2025-01-29 ASSESSMENT — KERATOMETRY
OD_K1POWER_DIOPTERS: 44.50
OD_K2POWER_DIOPTERS: 45.00
METHOD_AUTO_MANUAL: AUTO
OD_AXISANGLE_DEGREES: 025
OS_K2POWER_DIOPTERS: 45.50
OS_AXISANGLE_DEGREES: 153
OS_K1POWER_DIOPTERS: 44.00

## 2025-01-29 ASSESSMENT — LID EXAM ASSESSMENTS
OS_BLEPHARITIS: LUL 1+
OD_BLEPHARITIS: RUL 1+

## 2025-01-29 ASSESSMENT — REFRACTION_AUTOREFRACTION
OS_AXIS: 067
OS_CYLINDER: -1.75
OD_AXIS: 101
OD_CYLINDER: -1.00
OS_SPHERE: +1.00
OD_SPHERE: +0.50

## 2025-01-29 ASSESSMENT — VISUAL ACUITY
OD_BCVA: 20/30+3
OS_BCVA: 20/30+2

## 2025-01-29 ASSESSMENT — CONFRONTATIONAL VISUAL FIELD TEST (CVF)
OD_FINDINGS: FULL
OS_FINDINGS: FULL

## 2025-01-29 ASSESSMENT — REFRACTION_CURRENTRX
OD_CYLINDER: -0.75
OD_SPHERE: +0.25
OS_AXIS: 62
OS_OVR_VA: 20/
OS_CYLINDER: -1.00
OS_VPRISM_DIRECTION: SV
OD_AXIS: 111
OS_SPHERE: +0.50
OD_VPRISM_DIRECTION: SV
OD_OVR_VA: 20/

## 2025-01-29 ASSESSMENT — TONOMETRY
OD_IOP_MMHG: 11
OS_IOP_MMHG: 11

## 2025-02-08 ENCOUNTER — APPOINTMENT (OUTPATIENT)
Dept: OBGYN | Facility: CLINIC | Age: 50
End: 2025-02-08
Payer: COMMERCIAL

## 2025-02-08 ENCOUNTER — NON-APPOINTMENT (OUTPATIENT)
Age: 50
End: 2025-02-08

## 2025-02-08 VITALS
HEIGHT: 70 IN | DIASTOLIC BLOOD PRESSURE: 78 MMHG | BODY MASS INDEX: 20.33 KG/M2 | WEIGHT: 142 LBS | SYSTOLIC BLOOD PRESSURE: 110 MMHG

## 2025-02-08 DIAGNOSIS — Z01.419 ENCOUNTER FOR GYNECOLOGICAL EXAMINATION (GENERAL) (ROUTINE) W/OUT ABNORMAL FINDINGS: ICD-10-CM

## 2025-02-08 DIAGNOSIS — N89.8 OTHER SPECIFIED NONINFLAMMATORY DISORDERS OF VAGINA: ICD-10-CM

## 2025-02-08 DIAGNOSIS — N39.3 STRESS INCONTINENCE (FEMALE) (MALE): ICD-10-CM

## 2025-02-08 DIAGNOSIS — R92.30 DENSE BREASTS, UNSPECIFIED: ICD-10-CM

## 2025-02-08 DIAGNOSIS — Z12.39 ENCOUNTER FOR OTHER SCREENING FOR MALIGNANT NEOPLASM OF BREAST: ICD-10-CM

## 2025-02-08 PROCEDURE — 99459 PELVIC EXAMINATION: CPT

## 2025-02-08 PROCEDURE — 99396 PREV VISIT EST AGE 40-64: CPT

## 2025-02-08 PROCEDURE — 99213 OFFICE O/P EST LOW 20 MIN: CPT | Mod: 25

## 2025-02-08 RX ORDER — ESTRADIOL 0.1 MG/G
0.1 CREAM VAGINAL
Qty: 1 | Refills: 1 | Status: ACTIVE | COMMUNITY
Start: 2025-02-08 | End: 1900-01-01

## 2025-06-18 ENCOUNTER — NON-APPOINTMENT (OUTPATIENT)
Age: 50
End: 2025-06-18

## 2025-08-11 ENCOUNTER — APPOINTMENT (OUTPATIENT)
Dept: MRI IMAGING | Facility: CLINIC | Age: 50
End: 2025-08-11
Payer: COMMERCIAL

## 2025-08-11 ENCOUNTER — OUTPATIENT (OUTPATIENT)
Dept: OUTPATIENT SERVICES | Facility: HOSPITAL | Age: 50
LOS: 1 days | End: 2025-08-11
Payer: COMMERCIAL

## 2025-08-11 DIAGNOSIS — Z00.8 ENCOUNTER FOR OTHER GENERAL EXAMINATION: ICD-10-CM

## 2025-08-11 PROCEDURE — 70553 MRI BRAIN STEM W/O & W/DYE: CPT

## 2025-08-11 PROCEDURE — 70553 MRI BRAIN STEM W/O & W/DYE: CPT | Mod: 26

## 2025-08-27 ENCOUNTER — OUTPATIENT (OUTPATIENT)
Dept: OUTPATIENT SERVICES | Facility: HOSPITAL | Age: 50
LOS: 1 days | End: 2025-08-27
Payer: SELF-PAY

## 2025-08-27 ENCOUNTER — APPOINTMENT (OUTPATIENT)
Dept: CT IMAGING | Facility: CLINIC | Age: 50
End: 2025-08-27

## 2025-08-27 DIAGNOSIS — Z00.8 ENCOUNTER FOR OTHER GENERAL EXAMINATION: ICD-10-CM

## 2025-08-27 PROCEDURE — 75571 CT HRT W/O DYE W/CA TEST: CPT | Mod: 26

## 2025-08-27 PROCEDURE — 75571 CT HRT W/O DYE W/CA TEST: CPT
